# Patient Record
Sex: FEMALE | Race: WHITE | Employment: OTHER | URBAN - METROPOLITAN AREA
[De-identification: names, ages, dates, MRNs, and addresses within clinical notes are randomized per-mention and may not be internally consistent; named-entity substitution may affect disease eponyms.]

---

## 2017-04-11 ENCOUNTER — OFFICE VISIT (OUTPATIENT)
Dept: PAIN MANAGEMENT | Age: 77
End: 2017-04-11

## 2017-04-11 VITALS
WEIGHT: 173 LBS | SYSTOLIC BLOOD PRESSURE: 127 MMHG | RESPIRATION RATE: 17 BRPM | HEIGHT: 68 IN | DIASTOLIC BLOOD PRESSURE: 74 MMHG | HEART RATE: 91 BPM | BODY MASS INDEX: 26.22 KG/M2

## 2017-04-11 DIAGNOSIS — G89.29 NECK PAIN, CHRONIC: ICD-10-CM

## 2017-04-11 DIAGNOSIS — M25.561 BILATERAL CHRONIC KNEE PAIN: ICD-10-CM

## 2017-04-11 DIAGNOSIS — M25.562 BILATERAL CHRONIC KNEE PAIN: ICD-10-CM

## 2017-04-11 DIAGNOSIS — M15.9 GENERALIZED OSTEOARTHRITIS: ICD-10-CM

## 2017-04-11 DIAGNOSIS — G89.29 BILATERAL CHRONIC KNEE PAIN: ICD-10-CM

## 2017-04-11 DIAGNOSIS — Z86.59 HISTORY OF ANXIETY: ICD-10-CM

## 2017-04-11 DIAGNOSIS — M47.812 OSTEOARTHRITIS OF CERVICAL SPINE, UNSPECIFIED SPINAL OSTEOARTHRITIS COMPLICATION STATUS: ICD-10-CM

## 2017-04-11 DIAGNOSIS — Z86.59 HISTORY OF DEPRESSION: ICD-10-CM

## 2017-04-11 DIAGNOSIS — M25.50 POLYARTHRALGIA: Primary | ICD-10-CM

## 2017-04-11 DIAGNOSIS — G89.4 CHRONIC PAIN SYNDROME: ICD-10-CM

## 2017-04-11 DIAGNOSIS — Z79.899 ENCOUNTER FOR LONG-TERM (CURRENT) USE OF MEDICATIONS: ICD-10-CM

## 2017-04-11 DIAGNOSIS — M54.2 NECK PAIN, CHRONIC: ICD-10-CM

## 2017-04-11 DIAGNOSIS — M50.30 DEGENERATIVE DISC DISEASE, CERVICAL: ICD-10-CM

## 2017-04-11 LAB
ALCOHOL UR POC: NORMAL
AMPHETAMINES UR POC: NEGATIVE
BARBITURATES UR POC: NEGATIVE
BENZODIAZEPINES UR POC: NORMAL
BUPRENORPHINE UR POC: NORMAL
CANNABINOIDS UR POC: NEGATIVE
CARISOPRODOL UR POC: NORMAL
COCAINE UR POC: NEGATIVE
FENTANYL UR POC: NORMAL
MDMA/ECSTASY UR POC: NEGATIVE
METHADONE UR POC: NEGATIVE
METHAMPHETAMINE UR POC: NEGATIVE
METHYLPHENIDATE UR POC: NEGATIVE
OPIATES UR POC: NEGATIVE
OXYCODONE UR POC: NEGATIVE
PHENCYCLIDINE UR POC: NEGATIVE
PROPOXYPHENE UR POC: NORMAL
TRAMADOL UR POC: NORMAL
TRICYCLICS UR POC: NEGATIVE

## 2017-04-11 RX ORDER — MORPHINE SULFATE 15 MG/1
15 TABLET, FILM COATED, EXTENDED RELEASE ORAL 3 TIMES DAILY
Qty: 90 TAB | Refills: 0 | Status: SHIPPED | OUTPATIENT
Start: 2017-04-11 | End: 2017-05-02 | Stop reason: SDUPTHER

## 2017-04-11 RX ORDER — NALOXONE HYDROCHLORIDE 4 MG/.1ML
4 SPRAY NASAL AS NEEDED
Qty: 1 BOTTLE | Refills: 1 | Status: SHIPPED | OUTPATIENT
Start: 2017-04-11 | End: 2021-10-06

## 2017-04-11 RX ORDER — AMLODIPINE BESYLATE 5 MG/1
5 TABLET ORAL DAILY
COMMUNITY
End: 2021-10-06

## 2017-04-11 NOTE — MR AVS SNAPSHOT
Visit Information Date & Time Provider Department Dept. Phone Encounter #  
 4/11/2017  1:15 PM Mario Webber MD [address] [place] [address] for Pain Management [id number] Follow-up Instructions Return in about 1 month (around 5/11/2017). Your Appointments 5/2/2017  2:00 PM  
Follow Up with Mario Webber MD  
[address] [place] [address] for Pain Management [facility]) Appt Note: return in  1 month [address] [facility] [address]  
[phone]  
  
    
 5/5/2017  1:00 PM  
Office Visit with [facility] [address] [place] [address] for Pain Management (MACHELLE SCHEDULING) [address] [facility] [address]  
[phone] [facility] [id number] Upcoming Health Maintenance Date Due  
 FOOT EXAM Q1 3/3/1950 MICROALBUMIN Q1 3/3/1950 EYE EXAM RETINAL OR DILATED Q1 3/3/1950 DTaP/Tdap/Td series (1 - Tdap) 3/3/1959 ZOSTER VACCINE AGE 60> 3/3/1998 GLAUCOMA SCREENING Q2Y 3/3/2003 MEDICARE YEARLY EXAM 3/3/2003 Pneumococcal 65+ Low/Medium Risk (2 of 2 - PPSV23) 1/1/2016 INFLUENZA AGE 9 TO ADULT 8/1/2016 LIPID PANEL Q1 9/8/2016 HEMOGLOBIN A1C Q6M 5/25/2017 Allergies as of 4/11/2017  Review Complete On: 4/11/2017 By: Mario Webber MD  
  
 Severity Noted Reaction Type Reactions Adhesive  03/24/2016    Itching Augmentin [Amoxicillin-pot Clavulanate]  12/22/2012   Side Effect Nausea and Vomiting, Nausea Only Hydrocodone-acetaminophen  03/24/2016    Unknown (comments) Iodinated Contrast Media - Oral And Iv Dye  03/16/2016    Shortness of Breath \"MY BODY WENT AGAINST ME\" Nubain [Nalbuphine]    Unknown (comments), Shortness of Breath Labored breathing and chest tightness Current Immunizations  Reviewed on 1/19/2015 Name Date Pneumococcal Vaccine (Unspecified Type) 1/1/2011 Not reviewed this visit You Were Diagnosed With   
  
 Codes Comments Polyarthralgia    -  Primary ICD-10-CM: M25.50 ICD-9-CM: 719.49 Encounter for long-term (current) use of medications     ICD-10-CM: Z79.899 ICD-9-CM: V58.69 Chronic pain syndrome     ICD-10-CM: G89.4 ICD-9-CM: 338.4 Bilateral chronic knee pain     ICD-10-CM: M25.561, M25.562, G89.29 ICD-9-CM: 719.46, 338.29 Neck pain, chronic     ICD-10-CM: M54.2, G89.29 ICD-9-CM: 723.1, 338.29 Generalized osteoarthritis     ICD-10-CM: M15.9 ICD-9-CM: 715.00 Degenerative disc disease, cervical     ICD-10-CM: M50.30 ICD-9-CM: 722.4 Osteoarthritis of cervical spine, unspecified spinal osteoarthritis complication status     PNV-62-SA: O24.750 ICD-9-CM: 721.0 History of anxiety     ICD-10-CM: Z86.59 
ICD-9-CM: V11.8 History of depression     ICD-10-CM: Z86.59 
ICD-9-CM: V11.8 Vitals BP Pulse Resp Height(growth percentile) Weight(growth percentile) BMI  
 127/74 91 17 5' 8\" (1.727 m) 173 lb (78.5 kg) 26.3 kg/m2 OB Status Smoking Status Postmenopausal Never Smoker Vitals History BMI and BSA Data Body Mass Index Body Surface Area  
 26.3 kg/m 2 1.94 m 2 Preferred Pharmacy Pharmacy Name Phone [facility], [address] [place] [address] [phone] Your Updated Medication List  
  
   
This list is accurate as of: 4/11/17  1:56 PM.  Always use your most recent med list.  
  
  
  
  
 amiodarone 200 mg tablet Commonly known as:  CORDARONE Take 1 tablet by mouth daily. amLODIPine 5 mg tablet Commonly known as:  Kathi Half Take 5 mg by mouth daily. apixaban 5 mg tablet Commonly known as:  Claudean Welling Take 1 tablet by mouth two (2) times a day. Indications: AF  
  
 atorvastatin 20 mg tablet Commonly known as:  LIPITOR Take 20 mg by mouth daily. COREG 25 mg tablet Generic drug:  carvedilol Take 25 mg by mouth two (2) times daily (with meals). escitalopram oxalate 10 mg tablet Commonly known as:  Linda David Take 1 Tab by mouth daily. furosemide 20 mg tablet Commonly known as:  LASIX Take 3 tablet daily with breakfast  
  
 levothyroxine 25 mcg tablet Commonly known as:  SYNTHROID Take  by mouth Daily (before breakfast). lisinopril 20 mg tablet Commonly known as:  Nancy Feil Take 20 mg by mouth daily. morphine IR 30 mg tablet Commonly known as:  MS IR Take 1 Tab by mouth four (4) times daily as needed for Pain. Max Daily Amount: 120 mg.  
  
 oxyCODONE-acetaminophen 5-325 mg per tablet Commonly known as:  PERCOCET Take 1 Tab by mouth every six (6) hours as needed for Pain. Max Daily Amount: 4 Tabs. potassium chloride 20 mEq tablet Commonly known as:  K-DUR, KLOR-CON Take 0.5 Tabs by mouth daily. spironolactone 25 mg tablet Commonly known as:  ALDACTONE Take 1 Tab by mouth daily. We Performed the Following AMB POC DRUG SCREEN () [ [facility]] DRUG SCREEN [[id number] Custom] Follow-up Instructions Return in about 1 month (around 5/11/2017). Introducing [facility]! Toribio Nyhan introduces [employer] patient portal. Now you can access parts of your medical record, email your doctor's office, and request medication refills online. 1. In your internet browser, go to [email or web address] 2. Click on the First Time User? Click Here link in the Sign In box. You will see the New Member Sign Up page. 3. Enter your [employer] Access Code exactly as it appears below. You will not need to use this code after youve completed the sign-up process. If you do not sign up before the expiration date, you must request a new code. · [employer] Access Code: [email or web address] Expires: 7/10/2017  1:56 PM 
 
 4. Enter the last four digits of your Social Security Number (xxxx) and Date of Birth (mm/dd/yyyy) as indicated and click Submit. You will be taken to the next sign-up page. 5. Create a [email or web address] ID. This will be your [email or web address] login ID and cannot be changed, so think of one that is secure and easy to remember. 6. Create a [email or web address] password. You can change your password at any time. 7. Enter your Password Reset Question and Answer. This can be used at a later time if you forget your password. 8. Enter your e-mail address. You will receive e-mail notification when new information is available in [address] 9. Click Sign Up. You can now view and download portions of your medical record. 10. Click the Download Summary menu link to download a portable copy of your medical information. If you have questions, please visit the Frequently Asked Questions section of the [email or web address] website. Remember, [employer] is NOT to be used for urgent needs. For medical emergencies, dial 911. Now available from your iPhone and Android! Please provide this summary of care documentation to your next provider. Your primary care clinician is listed as NONE. If you have any questions after today's visit, please call [phone].

## 2017-04-11 NOTE — PROGRESS NOTES
HISTORY OF PRESENT ILLNESS  Edouard Gupta is a 76 y.o. female. HPI Comments: New patient referred by internal medicine clinic, Dr. Garay All for chronic pain management. Visit survey reviewed  On the pain diagram the patient indicates pain to many different joints including both shoulders, both knees, joints of both hands and also some neck pain. The least amount of pain, 7 out of 10. Greatest pain 8 out of 10. Average pain 7 out of 10. I have reviewed the listed medications on the visit survey which includes Lexapro. I have reviewed medications and the available medical records and also prescription monitoring program.  The information indicates that while she was involved with previous pain clinic, [address] [facility], they were prescribing a combination of immediate release morphine, 30 mg 4 times a day as needed in addition to extended release morphine 60 mg twice a day. There have been prescriptions for hydrocodone in the past.  The most recent prescriptions, I believe from medical clinics have been-5 mg Percocet. Actually the most recent prescription was for tramadol. Medical information indicates there have been prescriptions for EMLA cream, prescriptions for Neurontin. At least one physician has recommended, that she not use nonsteroidal anti-inflammatory drugs for medical reasons. I believe, history of significant coronary artery disease was the primary medical reason. I believe at times she has also been on blood thinners. Progress notes from different clinics indicate a history of generalized osteoarthritis. Chronic pain to different joints. Medical information indicates history of anxiety, history depression, history of fracture-distal end of radius. Today the patient did report a history of depression. Today the patient did admit to using her daughter's Valium recently. She has been involved with several different pain clinics over the years.   Most recently, she was discharged from [facility] pain clinic. I reviewed records, this included a termination letter. August 24, 2016. Reason for termination: \"Inconsistent drug screen\". -I was also able to review progress note from yet another, different pain clinic. This was comprehensive pain management center, [facility]. Dr. Rob Kumar. Progress note, 2/10/17. Primary diagnosis-neck pain, degenerative disc disease cervical spine. They recommended gabapentin, physical therapy. They did not recommend use of opioid pain medication, evaluating risk versus benefit. They did report on the radiology impression, CT of cervical spine, November 2013. Extensive degenerative disc disease and degenerative joint disease which had worsened from prior exam.  Please see the report for specific details. It should be noted that this particular physician in this particular pain clinic certainly has the reputation for rarely prescribing opioid pain medication. They did recommend cervical spine injections for the patient.  -Radiologist impression, x-ray of the knees, 2/27/17: Bilateral osteoarthritis  X-ray of the lumbar spine, 2/27/17: Moderate lumbar spondylosis. X-ray of cervical spine 2/27/17: Mild to moderate degenerative changes. X-ray of the left shoulder 11/13/16: Displaced angulated proximal metaphyseal fracture of the humerus with interval callus formation from 9/26/16. Osteopenia and degenerative changes as noted. Please see radiology reports for details. -- Chief complaint, neck pain and pain to multiple joints. Pain to different areas for at least 23 years. Pain is primarily achy. It is present during the day and the night, almost constant. Pain increases to different areas with physical activity. Neck pain does not classically radiate into either arm. No history of cervical or lumbar spine surgery. No history of joint surgery.   It is my understanding that knee replacements have been suggested in the past but the patient is not interested. The patient's daughter is present during today's visit and is helpful in providing history. The patient chose not to return to the most recent pain clinic, Dr. Edwin Lewis. Is my understanding she was not discharged from that particular clinic. She has tried different opioid and non-opioid pain medication over the years. She did not feel the gabapentin was helpful. No recent use of opioid pain medication. Significant nausea and vomiting with Vicodin. No significant side effects with Percocet or morphine. No benefit tramadol. She reports that when she was using the combination of short acting and long-acting morphine with previous pain clinic this did help her pain. Her daughter reports, that it may have been too much pain medication. She has worked with physical therapy in the past.  She has tried joint injections before. She has been seen by orthopedic clinics and also rheumatology in the past.      -Prescription monitoring program reviewed. --18 different prescriptions, 8 different prescribers, 4 different pharmacies in the past year. The patient  should keep track of total Tylenol intake and make sure liver function tests have been checked with primary care physician.    -opioid risk tool has been reviewed, and will be scanned. Total score--1    -The available medical record/information has been reviewed including notes from the referring physician's office.     -New patient survey reviewed and will be scanned. Please see this form for more information concerning PMH,FH,SH, and ROS. The majority of today's visit was spent counseling and coordinating care. Total visit time was greater than 60 minutes. - Preliminary urine screen reviewed.        - Additional time was spent reviewing medical records,interpreting data and educating the patient.                              -Discussing Dxes,condition and treatment options,possible side effects/risks/complications. Review of Systems   Constitutional: Negative for malaise/fatigue and weight loss. HENT: Positive for tinnitus. Eyes: Negative for blurred vision and double vision. Respiratory: Negative for cough and sputum production. Cardiovascular: Negative for chest pain and palpitations. Gastrointestinal: Negative for heartburn and nausea. Genitourinary: Negative for dysuria and urgency. Musculoskeletal: Positive for joint pain and neck pain. Skin: Negative for itching and rash. Neurological: Negative for focal weakness, seizures and headaches. Endo/Heme/Allergies: Negative for environmental allergies. Does not bruise/bleed easily. Psychiatric/Behavioral: Positive for depression. Negative for suicidal ideas. The patient has insomnia. Physical Exam   Constitutional: She appears well-developed and well-nourished. She is cooperative. She does not have a sickly appearance. HENT:   Head: Normocephalic and atraumatic. Right Ear: External ear normal. No drainage. Left Ear: External ear normal. No drainage. Nose: Nose normal.   Mouth/Throat: No oropharyngeal exudate. Eyes: Lids are normal. Right eye exhibits no discharge. Left eye exhibits no discharge. Right conjunctiva has no hemorrhage. Left conjunctiva has no hemorrhage. Pupils are equal.   Neck: Neck supple. No tracheal deviation present. No thyroid mass present. Cardiovascular: Normal rate and regular rhythm. No murmur heard. Pulmonary/Chest: Effort normal and breath sounds normal. No respiratory distress. Neurological: She is alert. She has normal reflexes. No cranial nerve deficit.    Mildly antalgic gait without assistive device  Decreased deep tendon reflexes lower extremities and upper extremities  Grossly normal strength upper extremities, grossly normal light touch sensation upper extremities  No spasm to cervical muscles  Limited range of motion and generalized tenderness of the neck  Significant decreased range of motion both shoulders, mild decreased range of motion both knees. Bony enlargement and mild increased warmth to both knees. Skin: Skin is intact. No abrasion and no rash noted. She is not diaphoretic. No cyanosis. Psychiatric: Her speech is normal. Her mood appears anxious. Her affect is not angry. She is not agitated. Thought content is not paranoid. Cognition and memory are not impaired. She does not express impulsivity or inappropriate judgment. She does not exhibit a depressed mood. ASSESSMENT and PLAN  Encounter Diagnoses   Name Primary?  Polyarthralgia Yes    Encounter for long-term (current) use of medications     Chronic pain syndrome     Bilateral chronic knee pain     Neck pain, chronic     Generalized osteoarthritis     Degenerative disc disease, cervical     Osteoarthritis of cervical spine, unspecified spinal osteoarthritis complication status     History of anxiety     History of depression    Pain is a complex sensory and emotional experience intimately related to the concept of suffering and the life experiences and psychological makeup of the individual. There is a host of psychosocial issues,including depression,anxiety,fear,altered social roles,and loss of activities which have a strong scientific basis for contributing to the chronic pain state. The effective treatment of chronic pain involves understanding the individual with pain. Issues of fear avoidance,catastrophizing,belief systems about exercise and injury,sleep disturbance,family dynamics,and other factors may play a role in the patient's experience of pain. Because the patient's current regimen places him/her at increased risk for possible overdose, a prescription for the lock so nasal spray is being provided.   The patient understands that this medication is only to be used in the setting of a possible overdose and that inadvertent use of this medication could precipitate overt withdrawal.  I discussed naloxone with the patient today. In addition, the patient has received the naloxone instruction sheet.    -The patient's significant pain is almost constant. Non-opioid medications did not lead to significant benefit. I do recommend a low-dose, long-acting opioid. She has benefited from morphine before. Extended release morphine 15 mg every 8 hours  Consider a compound cream to use to the neck and joints but first I would need approval from cardiology that use of an nonsteroidal anti-inflammatory drug in a cream form is felt to be safe. She would benefit from regular exercise, gentle, in the water. I have explained to the patient that with her past history of discharge from a previous pain clinic, recent use of her daughter's Valium, she really needs to be quite careful and follow the opioid agreement with our pain clinic. 1. Medications are prescribed with the objective of pain relief and improved physical and psychosocial function in this patient. (improve quality of life)  2. The patient has been counseled  on proper use of prescribed medications. 3. The patient has been counseled  about chronic medical conditions and their relationship to anxiety and depression. 4. Reviewed with patient self-help tools, home exercise, and lifestyle changes to assist the patient in self-management of symptoms. 5. Advised patient to have a primary care provider to continue care for health maintenance and general medical conditions and support for referral to specialty care as needed. 6. Reviewed with patient the treatment plan, goals of treatment plan, and limitations of treatment plan, to include the potential for side effects from medications. If side effects occur, it is the responsibility of the patient to inform the clinic so that a change in the treatment plan can be made in a safe manner.  The patient is advised that stopping prescribed medication may cause an increase in symptoms and possible medication withdrawal symptoms. The patient is informed an emergency room evaluation may be necessary if this occurs. DISPOSITION: The patients condition and plan were discussed at length and all questions were answered. -Dragon medical dictation software was used for portions of this report. Unintended errors may occur. Follow-up 1 month  She may continue her Lexapro from her other physician, to help with anxiety and depression.

## 2017-05-02 ENCOUNTER — OFFICE VISIT (OUTPATIENT)
Dept: PAIN MANAGEMENT | Age: 77
End: 2017-05-02

## 2017-05-02 VITALS — SYSTOLIC BLOOD PRESSURE: 117 MMHG | HEART RATE: 76 BPM | DIASTOLIC BLOOD PRESSURE: 67 MMHG | RESPIRATION RATE: 19 BRPM

## 2017-05-02 DIAGNOSIS — M15.9 GENERALIZED OSTEOARTHRITIS: ICD-10-CM

## 2017-05-02 DIAGNOSIS — G89.29 BILATERAL CHRONIC KNEE PAIN: ICD-10-CM

## 2017-05-02 DIAGNOSIS — G89.29 NECK PAIN, CHRONIC: ICD-10-CM

## 2017-05-02 DIAGNOSIS — M50.30 DEGENERATIVE DISC DISEASE, CERVICAL: ICD-10-CM

## 2017-05-02 DIAGNOSIS — M25.561 BILATERAL CHRONIC KNEE PAIN: ICD-10-CM

## 2017-05-02 DIAGNOSIS — G89.4 CHRONIC PAIN SYNDROME: ICD-10-CM

## 2017-05-02 DIAGNOSIS — M54.2 NECK PAIN, CHRONIC: ICD-10-CM

## 2017-05-02 DIAGNOSIS — M25.50 POLYARTHRALGIA: Primary | ICD-10-CM

## 2017-05-02 DIAGNOSIS — Z86.59 HISTORY OF ANXIETY: ICD-10-CM

## 2017-05-02 DIAGNOSIS — M25.562 BILATERAL CHRONIC KNEE PAIN: ICD-10-CM

## 2017-05-02 DIAGNOSIS — Z86.59 HISTORY OF DEPRESSION: ICD-10-CM

## 2017-05-02 RX ORDER — MORPHINE SULFATE 15 MG/1
15 TABLET, FILM COATED, EXTENDED RELEASE ORAL 3 TIMES DAILY
Qty: 90 TAB | Refills: 0 | Status: SHIPPED | OUTPATIENT
Start: 2017-05-02 | End: 2017-05-02

## 2017-05-02 RX ORDER — MORPHINE SULFATE 15 MG/1
15 TABLET, FILM COATED, EXTENDED RELEASE ORAL 3 TIMES DAILY
Qty: 90 TAB | Refills: 0 | Status: SHIPPED | OUTPATIENT
Start: 2017-06-01 | End: 2017-06-28

## 2017-05-02 NOTE — PROGRESS NOTES
HISTORY OF PRESENT ILLNESS  Cely Linn is a 76 y.o. female. HPI Comments: Meds help with pain control and quality of life. No new side effects reported today. Visit survey reviewed and will be scanned.  reviewed. Recent average level of pain(out of 10)-5  Chief complaint, pain to multiple joints  Chronic pain  Using extended release morphine 15 mg 3 times a day  Medication helps improve general activity, mood, sleep      Measuring clinical outcomes of chronic pain patients. This was reviewed today. The survey will be scanned. Please see the survey for details. Total score-8      Review of Systems   Constitutional: Negative for malaise/fatigue and weight loss. HENT: Positive for tinnitus. Eyes: Negative for blurred vision and double vision. Respiratory: Negative for cough and sputum production. Cardiovascular: Negative for chest pain and palpitations. Gastrointestinal: Negative for heartburn and nausea. Genitourinary: Negative for dysuria and urgency. Musculoskeletal: Positive for joint pain and neck pain. Skin: Negative for itching and rash. Neurological: Negative for focal weakness, seizures and headaches. Endo/Heme/Allergies: Negative for environmental allergies. Does not bruise/bleed easily. Psychiatric/Behavioral: Positive for depression. Negative for suicidal ideas. The patient has insomnia. Physical Exam   Constitutional: She appears well-developed and well-nourished. She is cooperative. She does not have a sickly appearance. HENT:   Head: Normocephalic and atraumatic. Right Ear: External ear normal. No drainage. Left Ear: External ear normal. No drainage. Nose: Nose normal.   Eyes: Lids are normal. Right eye exhibits no discharge. Left eye exhibits no discharge. Right conjunctiva has no hemorrhage. Left conjunctiva has no hemorrhage. Neck: Neck supple. No tracheal deviation present. No thyroid mass present.    Pulmonary/Chest: Effort normal. No respiratory distress. Neurological: She is alert. No cranial nerve deficit. Skin: Skin is intact. No rash noted. Psychiatric: Her speech is normal. Her affect is not angry. She does not express inappropriate judgment. Nursing note and vitals reviewed. ASSESSMENT and PLAN  Encounter Diagnoses   Name Primary?  Polyarthralgia Yes    Chronic pain syndrome     Bilateral chronic knee pain     Neck pain, chronic     Generalized osteoarthritis     Degenerative disc disease, cervical     History of anxiety     History of depression    No indicators for recent medication misuse.  reviewed. Pain Meds and Quality Of Life have been reviewed. Nonpharmacologic therapy and non-opioid pharmacologic therapy were considered. If opioid therapy is prescribed, this is only if the expected benefits are anticipated to outweigh risks. Possible changes to treatment plan considered. Support/education given as needed. Today-medications are as listed. No significant changes to medications. Follow up -- 2 months.

## 2017-05-02 NOTE — PROGRESS NOTES
Nursing Notes    Patient presents to the office today in follow-up. Reviewed medications with counts as follows:    Rx Date filled Qty Dispensed Pill Count Last Dose Short   Morphine er 15 mg 4/11/17 90 26 today no   Ms. Catherine Prajapati has a reminder for a \"due or due soon\" health maintenance. I have asked that she contact her primary care provider for follow-up on this health maintenance. POC UDS was not performed in office today    Any new labs or imaging since last appointment? NO    Have you been to an emergency room (ER) or urgent care clinic since your last visit? NO            Have you been hospitalized since your last visit? NO     If yes, where, when, and reason for visit? Have you seen or consulted any other health care providers outside of the [address] [place] [address]  since your last visit? NO     If yes, where, when, and reason for visit?

## 2017-05-02 NOTE — MR AVS SNAPSHOT
Visit Information Date & Time Provider Department Dept. Phone Encounter #  
 5/2/2017  2:00 PM Rosa Cr MD [facility] [phone] [id number] Follow-up Instructions Return in about 2 months (around 7/2/2017). Follow-up and Disposition History Your Appointments 5/2/2017  2:00 PM  
Follow Up with Rosa Cr MD  
[facility] [address]) Appt Note: return in  1 month [address] [facility] [address]  
[phone] [id number]  
  
    
 5/5/2017  1:00 PM  
Office Visit with [facility] EDUC CLASS [facility] (MACHELLE SCHEDULING) [address] [facility] [address]  
[phone]  [place] [id number] Upcoming Health Maintenance Date Due  
 FOOT EXAM Q1 3/3/1950 MICROALBUMIN Q1 3/3/1950 EYE EXAM RETINAL OR DILATED Q1 3/3/1948 DTaP/Tdap/Td series (1 - Tdap) 3/3/1959 ZOSTER VACCINE AGE 60> 3/3/1998 GLAUCOMA SCREENING Q2Y 3/3/2003 MEDICARE YEARLY EXAM 3/3/2003 Pneumococcal 65+ Low/Medium Risk (2 of 2 - PPSV23) 1/1/2016 LIPID PANEL Q1 9/8/2016 HEMOGLOBIN A1C Q6M 5/25/2017 INFLUENZA AGE 9 TO ADULT 8/1/2017 Allergies as of 5/2/2017  Review Complete On: 5/2/2017 By: Rosa Cr MD  
  
 Severity Noted Reaction Type Reactions Adhesive  03/24/2016    Itching Augmentin [Amoxicillin-pot Clavulanate]  12/22/2012   Side Effect Nausea and Vomiting, Nausea Only Hydrocodone-acetaminophen  03/24/2016    Unknown (comments) Iodinated Contrast Media - Oral And Iv Dye  03/16/2016    Shortness of Breath \"MY BODY WENT AGAINST ME\" Nubain [Nalbuphine]    Unknown (comments), Shortness of Breath Labored breathing and chest tightness Current Immunizations  Reviewed on 1/19/2015 Name Date Pneumococcal Vaccine (Unspecified Type) 1/1/2011 Not reviewed this visit You Were Diagnosed With   
  
 Codes Comments Polyarthralgia    -  Primary ICD-10-CM: M25.50 ICD-9-CM: 719.49 Chronic pain syndrome     ICD-10-CM: G89.4 ICD-9-CM: 338.4 Bilateral chronic knee pain     ICD-10-CM: M25.561, M25.562, G89.29 ICD-9-CM: 719.46, 338.29 Neck pain, chronic     ICD-10-CM: M54.2, G89.29 ICD-9-CM: 723.1, 338.29 Generalized osteoarthritis     ICD-10-CM: M15.9 ICD-9-CM: 715.00 Degenerative disc disease, cervical     ICD-10-CM: M50.30 ICD-9-CM: 722.4 History of anxiety     ICD-10-CM: Z86.59 
ICD-9-CM: V11.8 History of depression     ICD-10-CM: Z86.59 
ICD-9-CM: V11.8 Vitals BP Pulse Resp OB Status Smoking Status 117/67 76 19 Postmenopausal Never Smoker Vitals History Preferred Pharmacy Pharmacy Name Phone [facility] [address] [phone] Your Updated Medication List  
  
   
This list is accurate as of: 5/2/17  1:57 PM.  Always use your most recent med list.  
  
  
  
  
 amiodarone 200 mg tablet Commonly known as:  CORDARONE Take 1 tablet by mouth daily. amLODIPine 5 mg tablet Commonly known as:  Yoli Fast Take 5 mg by mouth daily. apixaban 5 mg tablet Commonly known as:  Florecita Umair Take 1 tablet by mouth two (2) times a day. Indications: AF  
  
 atorvastatin 20 mg tablet Commonly known as:  LIPITOR Take 20 mg by mouth daily. COREG 25 mg tablet Generic drug:  carvedilol Take 25 mg by mouth two (2) times daily (with meals). escitalopram oxalate 10 mg tablet Commonly known as:  Ranell Rakesh Take 1 Tab by mouth daily. furosemide 20 mg tablet Commonly known as:  LASIX Take 3 tablet daily with breakfast  
  
 levothyroxine 25 mcg tablet Commonly known as:  SYNTHROID Take  by mouth Daily (before breakfast). lisinopril 20 mg tablet Commonly known as:  Michael [place] Take 20 mg by mouth daily. * morphine IR 30 mg tablet Commonly known as:  MS IR Take 1 Tab by mouth four (4) times daily as needed for Pain. Max Daily Amount: 120 mg.  
  
 * morphine CR 15 mg CR tablet Commonly known as:  MS CONTIN Take 1 Tab by mouth three (3) times daily. Max Daily Amount: 45 mg. Start taking on:  6/1/2017  
  
 naloxone 4 mg/actuation Spry 4 mg by Nasal route as needed. oxyCODONE-acetaminophen 5-325 mg per tablet Commonly known as:  PERCOCET Take 1 Tab by mouth every six (6) hours as needed for Pain. Max Daily Amount: 4 Tabs. potassium chloride 20 mEq tablet Commonly known as:  K-DUR, KLOR-CON Take 0.5 Tabs by mouth daily. spironolactone 25 mg tablet Commonly known as:  ALDACTONE Take 1 Tab by mouth daily. * Notice: This list has 2 medication(s) that are the same as other medications prescribed for you. Read the directions carefully, and ask your doctor or other care provider to review them with you. Prescriptions Printed Refills  
 morphine CR (MS CONTIN) 15 mg CR tablet 0 Starting on: 6/1/2017 Sig: Take 1 Tab by mouth three (3) times daily. Max Daily Amount: 45 mg.  
 Class: Print Route: Oral  
  
Follow-up Instructions Return in about 2 months (around 7/2/2017). Introducing \[facility]\"" & HEALTH SERVICES! [employer] introduces [employer] patient portal. Now you can access parts of your medical record, email your doctor's office, and request medication refills online. 1. In your internet browser, go to [email or web address] 2. Click on the First Time User? Click Here link in the Sign In box. You will see the New Member Sign Up page. 3. Enter your [employer] Access Code exactly as it appears below. You will not need to use this code after youve completed the sign-up process.  If you do not sign up before the expiration date, you must request a new code. · [employer] Access Code: [email or web address] Expires: 7/10/2017  1:56 PM 
 
4. Enter the last four digits of your Social Security Number (xxxx) and Date of Birth (mm/dd/yyyy) as indicated and click Submit. You will be taken to the next sign-up page. 5. Create a [employer] ID. This will be your [employer] login ID and cannot be changed, so think of one that is secure and easy to remember. 6. Create a [employer] password. You can change your password at any time. 7. Enter your Password Reset Question and Answer. This can be used at a later time if you forget your password. 8. Enter your e-mail address. You will receive e-mail notification when new information is available in [address] 9. Click Sign Up. You can now view and download portions of your medical record. 10. Click the Download Summary menu link to download a portable copy of your medical information. If you have questions, please visit the Frequently Asked Questions section of the [employer] website. Remember, [employer] is NOT to be used for urgent needs. For medical emergencies, dial 911. Now available from your iPhone and Android! Please provide this summary of care documentation to your next provider. Your primary care clinician is listed as NONE. If you have any questions after today's visit, please call [phone].

## 2017-05-05 ENCOUNTER — OFFICE VISIT (OUTPATIENT)
Dept: PAIN MANAGEMENT | Age: 77
End: 2017-05-05

## 2017-05-05 DIAGNOSIS — Z71.89 COUNSELING AND COORDINATION OF CARE: Primary | ICD-10-CM

## 2017-05-05 NOTE — PROGRESS NOTES
Ms. Aubrey Hussein attended the Education Class facilitated by Abraham Dia LPN. Objectives of this class are to review practice policies, protocols and the Controlled Substances Consent and Treatment Agreement, discuss \"what if\" scenarios, introduce staff, and provide an opportunity for questions and answers. Ultimately, goals for this class are for Ms. Mendoza to:    · Be educated - Learn as much as possible about her pain and related treatment, our policies and the Controlled Substances Agreement. · Be responsible - Follow the providers advice regarding treatment recommendations, medications, and prescription information. · Be confident - Better manage her pain and return to a more functional lifestyle. At least 45 minutes was spent with the patient in face-to-face contact today.

## 2017-06-28 ENCOUNTER — OFFICE VISIT (OUTPATIENT)
Dept: PAIN MANAGEMENT | Age: 77
End: 2017-06-28

## 2017-06-28 VITALS
HEART RATE: 82 BPM | HEIGHT: 68 IN | RESPIRATION RATE: 16 BRPM | DIASTOLIC BLOOD PRESSURE: 82 MMHG | SYSTOLIC BLOOD PRESSURE: 138 MMHG | BODY MASS INDEX: 27.74 KG/M2 | WEIGHT: 183 LBS

## 2017-06-28 DIAGNOSIS — M54.2 NECK PAIN, CHRONIC: ICD-10-CM

## 2017-06-28 DIAGNOSIS — M25.561 BILATERAL CHRONIC KNEE PAIN: ICD-10-CM

## 2017-06-28 DIAGNOSIS — M50.30 DEGENERATIVE DISC DISEASE, CERVICAL: ICD-10-CM

## 2017-06-28 DIAGNOSIS — G89.29 BILATERAL CHRONIC KNEE PAIN: ICD-10-CM

## 2017-06-28 DIAGNOSIS — Z86.59 HISTORY OF ANXIETY: ICD-10-CM

## 2017-06-28 DIAGNOSIS — M25.562 BILATERAL CHRONIC KNEE PAIN: ICD-10-CM

## 2017-06-28 DIAGNOSIS — G89.29 NECK PAIN, CHRONIC: ICD-10-CM

## 2017-06-28 DIAGNOSIS — M15.9 GENERALIZED OSTEOARTHRITIS: ICD-10-CM

## 2017-06-28 DIAGNOSIS — M54.2 CERVICALGIA: ICD-10-CM

## 2017-06-28 DIAGNOSIS — M25.512 PAIN IN JOINT OF LEFT SHOULDER: ICD-10-CM

## 2017-06-28 DIAGNOSIS — M25.50 PAIN IN JOINT, MULTIPLE SITES: Primary | ICD-10-CM

## 2017-06-28 DIAGNOSIS — Z86.59 HISTORY OF DEPRESSION: ICD-10-CM

## 2017-06-28 DIAGNOSIS — G89.4 CHRONIC PAIN SYNDROME: ICD-10-CM

## 2017-06-28 DIAGNOSIS — L40.50 PSORIATIC ARTHROPATHY (HCC): ICD-10-CM

## 2017-06-28 RX ORDER — MORPHINE SULFATE 15 MG/1
15 TABLET, FILM COATED, EXTENDED RELEASE ORAL 3 TIMES DAILY
Qty: 90 TAB | Refills: 0 | Status: SHIPPED | OUTPATIENT
Start: 2017-07-27 | End: 2017-08-24

## 2017-06-28 RX ORDER — MORPHINE SULFATE 15 MG/1
15 TABLET, FILM COATED, EXTENDED RELEASE ORAL 3 TIMES DAILY
Qty: 90 TAB | Refills: 0 | Status: SHIPPED | OUTPATIENT
Start: 2017-06-28 | End: 2017-08-24

## 2017-06-28 NOTE — PROGRESS NOTES
Nursing Notes    Patient presents to the office today in follow-up. Reviewed medications with counts as follows:    Rx Date filled Qty Dispensed Pill Count Last Dose Short   Morphine er 15 mg 6/9/17 90 27 today no   Ms. Lea Figueroa has a reminder for a \"due or due soon\" health maintenance. I have asked that she contact her primary care provider for follow-up on this health maintenance. POC UDS was not performed in office today    Any new labs or imaging since last appointment? NO    Have you been to an emergency room (ER) or urgent care clinic since your last visit? NO            Have you been hospitalized since your last visit? NO     If yes, where, when, and reason for visit? Have you seen or consulted any other health care providers outside of the [address] [place] [address]  since your last visit? YES     If yes, where, when, and reason for visit?    Dr Yoon-cardiologist

## 2017-06-28 NOTE — PROGRESS NOTES
HISTORY OF PRESENT ILLNESS  Flower Stack is a 76 y.o. female. HPI Comments: Meds help with pain control and quality of life. No new side effects reported today. Visit survey reviewed and will be scanned.  reviewed. Recent average level of pain(out of 10)-6  Chief complaint, pain to multiple joints, neck pain  Chronic pain syndrome  Using extended release morphine 15 mg 3 times a day  Medication helps improve general activity, mood, walking, sleep, enjoyment of life              Neck Pain   Pertinent negatives include no chest pain and no headaches. Shoulder Pain      Knee Pain   Pertinent negatives include no chest pain and no headaches. Rib Pain    Pertinent negatives include no cough, no headaches, no malaise/fatigue, no nausea, no palpitations and no sputum production. Wrist Pain    Associated symptoms include neck pain. Pertinent negatives include no itching. Review of Systems   Constitutional: Negative for malaise/fatigue and weight loss. HENT: Positive for tinnitus. Eyes: Negative for blurred vision and double vision. Respiratory: Negative for cough and sputum production. Cardiovascular: Negative for chest pain and palpitations. Gastrointestinal: Negative for heartburn and nausea. Genitourinary: Negative for dysuria and urgency. Musculoskeletal: Positive for joint pain and neck pain. Skin: Negative for itching and rash. Neurological: Negative for focal weakness, seizures and headaches. Endo/Heme/Allergies: Negative for environmental allergies. Does not bruise/bleed easily. Psychiatric/Behavioral: Positive for depression. Negative for suicidal ideas. The patient has insomnia. Physical Exam   Constitutional: She appears well-developed and well-nourished. She is cooperative. She does not have a sickly appearance. HENT:   Head: Normocephalic and atraumatic. Right Ear: External ear normal. No drainage. Left Ear: External ear normal. No drainage.    Nose: Nose normal.   Eyes: Lids are normal. Right eye exhibits no discharge. Left eye exhibits no discharge. Right conjunctiva has no hemorrhage. Left conjunctiva has no hemorrhage. Neck: Neck supple. No tracheal deviation present. No thyroid mass present. Pulmonary/Chest: Effort normal. No respiratory distress. Neurological: She is alert. No cranial nerve deficit. Skin: Skin is intact. No rash noted. Psychiatric: Her speech is normal. Her affect is not angry. She does not express inappropriate judgment. Nursing note and vitals reviewed. ASSESSMENT and PLAN  Encounter Diagnoses   Name Primary?  Pain in joint, multiple sites Yes    Psoriatic arthropathy (HCC)     Cervicalgia     Pain in joint of left shoulder     Chronic pain syndrome     Bilateral chronic knee pain     Neck pain, chronic     Generalized osteoarthritis     Degenerative disc disease, cervical     History of anxiety     History of depression    No indicators for recent medication misuse.  reviewed. Pain Meds and Quality Of Life have been reviewed. Nonpharmacologic therapy and non-opioid pharmacologic therapy were considered. If opioid therapy is prescribed, this is only if the expected benefits are anticipated to outweigh risks. Possible changes to treatment plan considered. Support/education given as needed. Today-medications are as listed. No significant changes to medications. Follow up -- 2 months.

## 2017-06-28 NOTE — MR AVS SNAPSHOT
Visit Information Date & Time Provider Department Dept. Phone Encounter #  
 6/28/2017  2:00 PM Zoey Salgado MD [address] [place] [address] for Pain Management   Follow-up Instructions Return in about 2 months (around 8/28/2017). Upcoming Health Maintenance Date Due  
 FOOT EXAM Q1 3/3/1950 MICROALBUMIN Q1 3/3/1950 EYE EXAM RETINAL OR DILATED Q1 3/3/1948 DTaP/Tdap/Td series (1 - Tdap) 3/3/1959 ZOSTER VACCINE AGE 60> 3/3/1998 GLAUCOMA SCREENING Q2Y 3/3/2003 MEDICARE YEARLY EXAM 3/3/2003 Pneumococcal 65+ Low/Medium Risk (2 of 2 - PPSV23) 1/1/2016 LIPID PANEL Q1 9/8/2016 HEMOGLOBIN A1C Q6M 5/25/2017 INFLUENZA AGE 9 TO ADULT 8/1/2017 Allergies as of 6/28/2017  Review Complete On: 6/28/2017 By: Zoey Salgado MD  
  
 Severity Noted Reaction Type Reactions Adhesive  03/24/2016    Itching Augmentin [Amoxicillin-pot Clavulanate]  12/22/2012   Side Effect Nausea and Vomiting, Nausea Only Hydrocodone-acetaminophen  03/24/2016    Unknown (comments) Iodinated Contrast- Oral And Iv Dye  03/16/2016    Shortness of Breath \"MY BODY WENT AGAINST ME\" Nubain [Nalbuphine]    Unknown (comments), Shortness of Breath Labored breathing and chest tightness Current Immunizations  Reviewed on 1/19/2015 Name Date Pneumococcal Vaccine (Unspecified Type) 1/1/2011 Not reviewed this visit You Were Diagnosed With   
  
 Codes Comments Pain in joint, multiple sites    -  Primary ICD-10-CM: M25.50 ICD-9-CM: 719.49 Psoriatic arthropathy ([facility] [address]     ICD-10-CM: L40.50 ICD-9-CM: 696.0 Cervicalgia     ICD-10-CM: M54.2 ICD-9-CM: 723.1 Pain in joint of left shoulder     ICD-10-CM: M25.512 ICD-9-CM: 719.41 Chronic pain syndrome     ICD-10-CM: G89.4 ICD-9-CM: 338.4 Bilateral chronic knee pain     ICD-10-CM: M25.561, M25.562, G89.29 ICD-9-CM: 719.46, 338.29   
 Neck pain, chronic     ICD-10-CM: M54.2, G89.29 ICD-9-CM: 723.1, 338.29 Generalized osteoarthritis     ICD-10-CM: M15.9 ICD-9-CM: 715.00 Degenerative disc disease, cervical     ICD-10-CM: M50.30 ICD-9-CM: 722.4 History of anxiety     ICD-10-CM: Z86.59 
ICD-9-CM: V11.8 History of depression     ICD-10-CM: Z86.59 
ICD-9-CM: V11.8 Vitals BP Pulse Resp Height(growth percentile) Weight(growth percentile) BMI  
 138/82 82 16 5' 8\" (1.727 m) 183 lb (83 kg) 27.83 kg/m2 OB Status Smoking Status Postmenopausal Never Smoker Vitals History BMI and BSA Data Body Mass Index Body Surface Area  
 27.83 kg/m 2 2 m 2 Preferred Pharmacy Pharmacy Name Phone [facility], [address] [phone] Your Updated Medication List  
  
   
This list is accurate as of: 6/28/17  2:26 PM.  Always use your most recent med list.  
  
  
  
  
 amiodarone 200 mg tablet Commonly known as:  CORDARONE Take 1 tablet by mouth daily. amLODIPine 5 mg tablet Commonly known as:  [place] Arielle Take 5 mg by mouth daily. apixaban 5 mg tablet Commonly known as:  Venida Dedrick Take 1 tablet by mouth two (2) times a day. Indications: AF  
  
 atorvastatin 20 mg tablet Commonly known as:  LIPITOR Take 20 mg by mouth daily. COREG 25 mg tablet Generic drug:  carvedilol Take 25 mg by mouth two (2) times daily (with meals). escitalopram oxalate 10 mg tablet Commonly known as:  Michael [place] Take 1 Tab by mouth daily. furosemide 20 mg tablet Commonly known as:  LASIX Take 3 tablet daily with breakfast  
  
 levothyroxine 25 mcg tablet Commonly known as:  SYNTHROID Take  by mouth Daily (before breakfast). lisinopril 20 mg tablet Commonly known as:  Geoffrey Dance Take 20 mg by mouth daily. * morphine IR 30 mg tablet Commonly known as:  MS IR  
 Take 1 Tab by mouth four (4) times daily as needed for Pain. Max Daily Amount: 120 mg.  
  
 * morphine CR 15 mg CR tablet Commonly known as:  MS CONTIN Take 1 Tab by mouth three (3) times daily. Max Daily Amount: 45 mg.  
  
 * morphine CR 15 mg CR tablet Commonly known as:  MS CONTIN Take 1 Tab by mouth three (3) times daily. Max Daily Amount: 45 mg. Start taking on:  7/27/2017  
  
 naloxone 4 mg/actuation Spry 4 mg by Nasal route as needed. oxyCODONE-acetaminophen 5-325 mg per tablet Commonly known as:  PERCOCET Take 1 Tab by mouth every six (6) hours as needed for Pain. Max Daily Amount: 4 Tabs. potassium chloride 20 mEq tablet Commonly known as:  K-DUR, KLOR-CON Take 0.5 Tabs by mouth daily. spironolactone 25 mg tablet Commonly known as:  ALDACTONE Take 1 Tab by mouth daily. * Notice: This list has 3 medication(s) that are the same as other medications prescribed for you. Read the directions carefully, and ask your doctor or other care provider to review them with you. Prescriptions Printed Refills  
 morphine CR (MS CONTIN) 15 mg CR tablet 0 Sig: Take 1 Tab by mouth three (3) times daily. Max Daily Amount: 45 mg.  
 Class: Print Route: Oral  
 morphine CR (MS CONTIN) 15 mg CR tablet 0 Starting on: 7/27/2017 Sig: Take 1 Tab by mouth three (3) times daily. Max Daily Amount: 45 mg.  
 Class: Print Route: Oral  
  
Follow-up Instructions Return in about 2 months (around 8/28/2017). Introducing [facility] & HEALTH SERVICES! [employer] introduces [employer] patient portal. Now you can access parts of your medical record, email your doctor's office, and request medication refills online. 1. In your internet browser, go to [email or web address] 2. Click on the First Time User? Click Here link in the Sign In box. You will see the New Member Sign Up page. 3. Enter your [employer] Access Code exactly as it appears below. You will not need to use this code after youve completed the sign-up process. If you do not sign up before the expiration date, you must request a new code. · [employer] Access Code: [email or web address] Expires: 7/10/2017  1:56 PM 
 
4. Enter the last four digits of your Social Security Number (xxxx) and Date of Birth (mm/dd/yyyy) as indicated and click Submit. You will be taken to the next sign-up page. 5. Create a [employer] ID. This will be your [employer] login ID and cannot be changed, so think of one that is secure and easy to remember. 6. Create a [employer] password. You can change your password at any time. 7. Enter your Password Reset Question and Answer. This can be used at a later time if you forget your password. 8. Enter your e-mail address. You will receive e-mail notification when new information is available in [address] 9. Click Sign Up. You can now view and download portions of your medical record. 10. Click the Download Summary menu link to download a portable copy of your medical information. If you have questions, please visit the Frequently Asked Questions section of the [employer] website. Remember, [employer] is NOT to be used for urgent needs. For medical emergencies, dial 911. Now available from your iPhone and Android! Please provide this summary of care documentation to your next provider. Your primary care clinician is listed as NONE. If you have any questions after today's visit, please call [phone].

## 2017-08-24 ENCOUNTER — OFFICE VISIT (OUTPATIENT)
Dept: PAIN MANAGEMENT | Age: 77
End: 2017-08-24

## 2017-08-24 VITALS
WEIGHT: 184 LBS | RESPIRATION RATE: 16 BRPM | DIASTOLIC BLOOD PRESSURE: 92 MMHG | HEART RATE: 89 BPM | TEMPERATURE: 98 F | BODY MASS INDEX: 27.89 KG/M2 | SYSTOLIC BLOOD PRESSURE: 168 MMHG | HEIGHT: 68 IN

## 2017-08-24 DIAGNOSIS — G89.4 CHRONIC PAIN SYNDROME: ICD-10-CM

## 2017-08-24 DIAGNOSIS — L40.50 PSORIATIC ARTHROPATHY (HCC): ICD-10-CM

## 2017-08-24 DIAGNOSIS — M25.50 POLYARTHRALGIA: Primary | ICD-10-CM

## 2017-08-24 DIAGNOSIS — M54.2 CERVICALGIA: ICD-10-CM

## 2017-08-24 DIAGNOSIS — M25.569 ARTHRALGIA OF LOWER LEG, UNSPECIFIED LATERALITY: ICD-10-CM

## 2017-08-24 DIAGNOSIS — M25.50 PAIN IN JOINT, MULTIPLE SITES: ICD-10-CM

## 2017-08-24 DIAGNOSIS — M25.512 PAIN IN JOINT OF LEFT SHOULDER: ICD-10-CM

## 2017-08-24 RX ORDER — MORPHINE SULFATE 30 MG/1
30 TABLET, FILM COATED, EXTENDED RELEASE ORAL 3 TIMES DAILY
Qty: 90 TAB | Refills: 0 | Status: SHIPPED | OUTPATIENT
Start: 2017-08-24 | End: 2017-08-24 | Stop reason: SDUPTHER

## 2017-08-24 RX ORDER — MORPHINE SULFATE 30 MG/1
30 TABLET, FILM COATED, EXTENDED RELEASE ORAL 3 TIMES DAILY
Qty: 90 TAB | Refills: 0 | Status: SHIPPED | OUTPATIENT
Start: 2017-09-23 | End: 2017-10-17

## 2017-08-24 NOTE — PATIENT INSTRUCTIONS

## 2017-08-24 NOTE — PROGRESS NOTES
HISTORY OF PRESENT ILLNESS  Charbel Rhoades is a 76 y.o. female. HPI Comments: Visit survey reviewed  Chief complaint, polyarthralgia, pain to multiple joints  Chronic pain syndrome  We are prescribing extended release morphine 15 mg 3 times a day  She has been using this from our clinic for about 4 or 5 months now. She reports that she has never been impressed that this is been very helpful. Her daughter who was present today supports that opinion. Patient would like to try a higher dose and states she has been on higher doses in the past.  I will prescribe extended release morphine, 60 mg 3 times a day. She denies any recent fall. She does not feel that the medication increases any chance for fall. She reports she does have a walker and a cane and a wheelchair at home. I reminded her to use an assistive device if she feels she might have a fall. Review of Systems   Constitutional: Negative for malaise/fatigue and weight loss. HENT: Positive for tinnitus. Eyes: Negative for blurred vision and double vision. Respiratory: Negative for cough and sputum production. Cardiovascular: Negative for chest pain and palpitations. Gastrointestinal: Negative for heartburn and nausea. Genitourinary: Negative for dysuria and urgency. Musculoskeletal: Positive for joint pain and neck pain. Skin: Negative for itching and rash. Neurological: Negative for focal weakness, seizures and headaches. Endo/Heme/Allergies: Negative for environmental allergies. Does not bruise/bleed easily. Psychiatric/Behavioral: Positive for depression. Negative for suicidal ideas. The patient has insomnia. Physical Exam   Constitutional: She appears well-developed and well-nourished. She is cooperative. She does not have a sickly appearance. HENT:   Head: Normocephalic and atraumatic. Right Ear: External ear normal. No drainage. Left Ear: External ear normal. No drainage.    Nose: Nose normal.   Eyes: Lids are normal. Right eye exhibits no discharge. Left eye exhibits no discharge. Right conjunctiva has no hemorrhage. Left conjunctiva has no hemorrhage. Neck: Neck supple. No tracheal deviation present. No thyroid mass present. Pulmonary/Chest: Effort normal. No respiratory distress. Neurological: She is alert. No cranial nerve deficit. Skin: Skin is intact. No rash noted. Psychiatric: Her speech is normal. Her affect is not angry. She does not express inappropriate judgment. Nursing note and vitals reviewed. ASSESSMENT and PLAN  Encounter Diagnoses   Name Primary?  Polyarthralgia Yes    Psoriatic arthropathy (HCC)     Arthralgia of lower leg, unspecified laterality     Pain in joint, multiple sites     Cervicalgia     Pain in joint of left shoulder     Chronic pain syndrome    No indicators for recent medication misuse.  reviewed. Pain Meds and Quality Of Life have been reviewed. Nonpharmacologic therapy and non-opioid pharmacologic therapy were considered. If opioid therapy is prescribed, this is only if the expected benefits are anticipated to outweigh risks. Possible changes to treatment plan considered. Support/education given as needed. Today-medications are as listed. changes to medications. Follow up -- 2 months.

## 2017-08-24 NOTE — MR AVS SNAPSHOT
Visit Information Date & Time Provider Department Dept. Phone Encounter #  
 8/24/2017  2:00 PM Sweta Riojas MD [facility] [address] for Pain Management [phone] Follow-up Instructions Return in about 2 months (around 10/24/2017). Follow-up and Disposition History Upcoming Health Maintenance Date Due  
 FOOT EXAM Q1 3/3/1950 MICROALBUMIN Q1 3/3/1948 EYE EXAM RETINAL OR DILATED Q1 3/3/1948 DTaP/Tdap/Td series (1 - Tdap) 3/3/1959 ZOSTER VACCINE AGE 60> 1/3/1998 GLAUCOMA SCREENING Q2Y 3/3/2003 MEDICARE YEARLY EXAM 3/3/2003 Pneumococcal 65+ Low/Medium Risk (2 of 2 - PPSV23) 1/1/2016 LIPID PANEL Q1 9/8/2016 HEMOGLOBIN A1C Q6M 5/25/2017 INFLUENZA AGE 9 TO ADULT 8/1/2017 Allergies as of 8/24/2017  Review Complete On: 8/24/2017 By: Sweta Riojas MD  
  
 Severity Noted Reaction Type Reactions Adhesive  03/24/2016    Itching Augmentin [Amoxicillin-pot Clavulanate]  12/22/2012   Side Effect Nausea and Vomiting, Nausea Only Hydrocodone-acetaminophen  03/24/2016    Unknown (comments) Iodinated Contrast- Oral And Iv Dye  03/16/2016    Shortness of Breath \"MY BODY WENT AGAINST ME\" Nubain [Nalbuphine]    Unknown (comments), Shortness of Breath Labored breathing and chest tightness Current Immunizations  Reviewed on 1/19/2015 Name Date Pneumococcal Vaccine (Unspecified Type) 1/1/2011 Not reviewed this visit You Were Diagnosed With   
  
 Codes Comments Polyarthralgia    -  Primary ICD-10-CM: M25.50 ICD-9-CM: 719.49 Psoriatic arthropathy ([facility]     ICD-10-CM: L40.50 ICD-9-CM: 696.0 Arthralgia of lower leg, unspecified laterality     ICD-10-CM: M25.569 ICD-9-CM: 719.46 Pain in joint, multiple sites     ICD-10-CM: M25.50 ICD-9-CM: 719.49 Cervicalgia     ICD-10-CM: M54.2 ICD-9-CM: 723.1 Pain in joint of left shoulder     ICD-10-CM: M25.512 ICD-9-CM: 719.41   
 Chronic pain syndrome     ICD-10-CM: G89.4 ICD-9-CM: 338. 4 Vitals BP Pulse Temp Resp Height(growth percentile) Weight(growth percentile) (!) 168/92 89 98 °F (36.7 °C) 16 5' 8\" (1.727 m) 184 lb (83.5 kg) BMI OB Status Smoking Status 27.98 kg/m2 Postmenopausal Never Smoker Vitals History BMI and BSA Data Body Mass Index Body Surface Area  
 27.98 kg/m 2 2 m 2 Preferred Pharmacy Pharmacy Name Phone [facility], [address] [phone] Your Updated Medication List  
  
   
This list is accurate as of: 8/24/17  2:37 PM.  Always use your most recent med list.  
  
  
  
  
 amiodarone 200 mg tablet Commonly known as:  CORDARONE Take 1 tablet by mouth daily. amLODIPine 5 mg tablet Commonly known as:  Orval Leatha Take 5 mg by mouth daily. apixaban 5 mg tablet Commonly known as:  Lenord Car Take 1 tablet by mouth two (2) times a day. Indications: AF  
  
 atorvastatin 20 mg tablet Commonly known as:  LIPITOR Take 20 mg by mouth daily. COREG 25 mg tablet Generic drug:  carvedilol Take 25 mg by mouth two (2) times daily (with meals). escitalopram oxalate 10 mg tablet Commonly known as:  Bringhurst Roof Take 1 Tab by mouth daily. furosemide 20 mg tablet Commonly known as:  LASIX Take 3 tablet daily with breakfast  
  
 levothyroxine 25 mcg tablet Commonly known as:  SYNTHROID Take  by mouth Daily (before breakfast). lisinopril 20 mg tablet Commonly known as:  Alexis Handing Take 20 mg by mouth daily. * morphine IR 30 mg tablet Commonly known as:  MS IR Take 1 Tab by mouth four (4) times daily as needed for Pain. Max Daily Amount: 120 mg.  
  
 * morphine CR 30 mg CR tablet Commonly known as:  MS CONTIN Take 1 Tab by mouth three (3) times daily. Max Daily Amount: 90 mg. Start taking on:  9/23/2017 naloxone 4 mg/actuation Spry 4 mg by Nasal route as needed. oxyCODONE-acetaminophen 5-325 mg per tablet Commonly known as:  PERCOCET Take 1 Tab by mouth every six (6) hours as needed for Pain. Max Daily Amount: 4 Tabs. potassium chloride 20 mEq tablet Commonly known as:  K-DUR, KLOR-CON Take 0.5 Tabs by mouth daily. spironolactone 25 mg tablet Commonly known as:  ALDACTONE Take 1 Tab by mouth daily. * Notice: This list has 2 medication(s) that are the same as other medications prescribed for you. Read the directions carefully, and ask your doctor or other care provider to review them with you. Prescriptions Printed Refills  
 morphine CR (MS CONTIN) 30 mg CR tablet 0 Starting on: 9/23/2017 Sig: Take 1 Tab by mouth three (3) times daily. Max Daily Amount: 90 mg.  
 Class: Print Route: Oral  
  
Follow-up Instructions Return in about 2 months (around 10/24/2017). Patient Instructions Preventing Falls: Care Instructions Your Care Instructions Getting around your home safely can be a challenge if you have injuries or health problems that make it easy for you to fall. Loose rugs and furniture in walkways are among the dangers for many older people who have problems walking or who have poor eyesight. People who have conditions such as arthritis, osteoporosis, or dementia also have to be careful not to fall. You can make your home safer with a few simple measures. Follow-up care is a key part of your treatment and safety. Be sure to make and go to all appointments, and call your doctor if you are having problems. It's also a good idea to know your test results and keep a list of the medicines you take. How can you care for yourself at home? Taking care of yourself · You may get dizzy if you do not drink enough water.  To prevent dehydration, drink plenty of fluids, enough so that your urine is light yellow or clear like water. Choose water and other caffeine-free clear liquids. If you have kidney, heart, or liver disease and have to limit fluids, talk with your doctor before you increase the amount of fluids you drink. · Exercise regularly to improve your strength, muscle tone, and balance. Walk if you can. Swimming may be a good choice if you cannot walk easily. · Have your vision and hearing checked each year or any time you notice a change. If you have trouble seeing and hearing, you might not be able to avoid objects and could lose your balance. · Know the side effects of the medicines you take. Ask your doctor or pharmacist whether the medicines you take can affect your balance. Sleeping pills or sedatives can affect your balance. · Limit the amount of alcohol you drink. Alcohol can impair your balance and other senses. · Ask your doctor whether calluses or corns on your feet need to be removed. If you wear loose-fitting shoes because of calluses or corns, you can lose your balance and fall. · Talk to your doctor if you have numbness in your feet. Preventing falls at home · Remove raised doorway thresholds, throw rugs, and clutter. Repair loose carpet or raised areas in the floor. · Move furniture and electrical cords to keep them out of walking paths. · Use nonskid floor wax, and wipe up spills right away, especially on ceramic tile floors. · If you use a walker or cane, put rubber tips on it. If you use crutches, clean the bottoms of them regularly with an abrasive pad, such as steel wool. · Keep your house well lit, especially [facility], and outside walkways. Use night-lights in areas such as hallways and bathrooms. Add extra light switches or use remote switches (such as switches that go on or off when you clap your hands) to make it easier to turn lights on if you have to get up during the night. · Install sturdy handrails on stairways. · Move items in your cabinets so that the things you use a lot are on the lower shelves (about waist level). · Keep a cordless phone and a flashlight with new batteries by your bed. If possible, put a phone in each of the main rooms of your house, or carry a cell phone in case you fall and cannot reach a phone. Or, you can wear a device around your neck or wrist. You push a button that sends a signal for help. · Wear low-heeled shoes that fit well and give your feet good support. Use footwear with nonskid soles. Check the heels and soles of your shoes for wear. Repair or replace worn heels or soles. · Do not wear socks without shoes on wood floors. · Walk on the grass when the sidewalks are slippery. If you live in an area that gets snow and ice in the winter, sprinkle salt on slippery steps and sidewalks. Preventing falls in the bath · Install grab bars and nonskid mats inside and outside your shower or tub and near the toilet and sinks. · Use shower chairs and bath benches. · Use a hand-held shower head that will allow you to sit while showering. · Get into a tub or shower by putting the weaker leg in first. Get out of a tub or shower with your strong side first. 
· Repair loose toilet seats and consider installing a raised toilet seat to make getting on and off the toilet easier. · Keep your bathroom door unlocked while you are in the shower. Where can you learn more? Go to http://yousuf-lamine.info/. Enter [phone] [id number] [phone] in the search box to learn more about \"Preventing Falls: Care Instructions. \" Current as of: August 4, 2016 Content Version: 11.3 © 2403-7449 [employer], [employer]ang. Care instructions adapted under license by [employer] (which disclaims liability or warranty for this information).  If you have questions about a medical condition or this instruction, always ask your healthcare professional. Cristian [employer] disclaims any warranty or liability for your use of this information. Introducing [facility]! [employer] introduces Hotchalk patient portal. Now you can access parts of your medical record, email your doctor's office, and request medication refills online. 1. In your internet browser, go to [email or web address] 2. Click on the First Time User? Click Here link in the Sign In box. You will see the New Member Sign Up page. 3. Enter your Hotchalk Access Code exactly as it appears below. You will not need to use this code after youve completed the sign-up process. If you do not sign up before the expiration date, you must request a new code. · Hotchalk Access Code: [email or web address] Expires: 11/22/2017  2:37 PM 
 
4. Enter the last four digits of your Social Security Number (xxxx) and Date of Birth (mm/dd/yyyy) as indicated and click Submit. You will be taken to the next sign-up page. 5. Create a Hotchalk ID. This will be your Hotchalk login ID and cannot be changed, so think of one that is secure and easy to remember. 6. Create a Hotchalk password. You can change your password at any time. 7. Enter your Password Reset Question and Answer. This can be used at a later time if you forget your password. 8. Enter your e-mail address. You will receive e-mail notification when new information is available in [address] 9. Click Sign Up. You can now view and download portions of your medical record. 10. Click the Download Summary menu link to download a portable copy of your medical information. If you have questions, please visit the Frequently Asked Questions section of the Hotchalk website. Remember, Hotchalk is NOT to be used for urgent needs. For medical emergencies, dial 911. Now available from your iPhone and Android! Please provide this summary of care documentation to your next provider. Your primary care clinician is listed as NONE. If you have any questions after today's visit, please call [phone].

## 2017-08-24 NOTE — PROGRESS NOTES
Nursing Notes    Patient presents to the office today in follow-up. Reviewed medications with counts as follows:    Rx Date filled Qty Dispensed Pill Count Last Dose Short   Morphine er 15 mg 8/6/17 90 28 Today. 1 no   Ms. Nidia Mercer has a reminder for a \"due or due soon\" health maintenance. I have asked that she contact her primary care provider for follow-up on this health maintenance. Comments: (+) fall risk. Provider made aware    POC UDS was not performed in office today    Any new labs or imaging since last appointment? NO    Have you been to an emergency room (ER) or urgent care clinic since your last visit? NO            Have you been hospitalized since your last visit? NO     If yes, where, when, and reason for visit? Have you seen or consulted any other health care providers outside of the [address] [place] [address]  since your last visit? NO     If yes, where, when, and reason for visit?

## 2017-10-17 ENCOUNTER — OFFICE VISIT (OUTPATIENT)
Dept: PAIN MANAGEMENT | Age: 77
End: 2017-10-17

## 2017-10-17 VITALS
HEART RATE: 75 BPM | DIASTOLIC BLOOD PRESSURE: 67 MMHG | TEMPERATURE: 96 F | BODY MASS INDEX: 27.98 KG/M2 | RESPIRATION RATE: 22 BRPM | WEIGHT: 184 LBS | SYSTOLIC BLOOD PRESSURE: 153 MMHG

## 2017-10-17 DIAGNOSIS — L40.50 PSORIATIC ARTHROPATHY (HCC): ICD-10-CM

## 2017-10-17 DIAGNOSIS — G89.29 BILATERAL CHRONIC KNEE PAIN: ICD-10-CM

## 2017-10-17 DIAGNOSIS — M50.30 DEGENERATIVE DISC DISEASE, CERVICAL: ICD-10-CM

## 2017-10-17 DIAGNOSIS — G89.4 CHRONIC PAIN SYNDROME: ICD-10-CM

## 2017-10-17 DIAGNOSIS — M47.812 OSTEOARTHRITIS OF CERVICAL SPINE, UNSPECIFIED SPINAL OSTEOARTHRITIS COMPLICATION STATUS: ICD-10-CM

## 2017-10-17 DIAGNOSIS — M25.50 POLYARTHRALGIA: Primary | ICD-10-CM

## 2017-10-17 DIAGNOSIS — Z86.59 HISTORY OF DEPRESSION: ICD-10-CM

## 2017-10-17 DIAGNOSIS — Z79.899 ENCOUNTER FOR LONG-TERM (CURRENT) USE OF HIGH-RISK MEDICATION: ICD-10-CM

## 2017-10-17 DIAGNOSIS — Z86.59 HISTORY OF ANXIETY: ICD-10-CM

## 2017-10-17 DIAGNOSIS — M15.9 GENERALIZED OSTEOARTHRITIS: ICD-10-CM

## 2017-10-17 DIAGNOSIS — M54.2 NECK PAIN, CHRONIC: ICD-10-CM

## 2017-10-17 DIAGNOSIS — M25.562 BILATERAL CHRONIC KNEE PAIN: ICD-10-CM

## 2017-10-17 DIAGNOSIS — M25.561 BILATERAL CHRONIC KNEE PAIN: ICD-10-CM

## 2017-10-17 DIAGNOSIS — G89.29 NECK PAIN, CHRONIC: ICD-10-CM

## 2017-10-17 LAB
ALCOHOL UR POC: NORMAL
AMPHETAMINES UR POC: NEGATIVE
BARBITURATES UR POC: NEGATIVE
BENZODIAZEPINES UR POC: NORMAL
BUPRENORPHINE UR POC: NORMAL
CANNABINOIDS UR POC: NEGATIVE
CARISOPRODOL UR POC: NORMAL
COCAINE UR POC: NEGATIVE
FENTANYL UR POC: NORMAL
MDMA/ECSTASY UR POC: NEGATIVE
METHADONE UR POC: NEGATIVE
METHAMPHETAMINE UR POC: NEGATIVE
METHYLPHENIDATE UR POC: NEGATIVE
OPIATES UR POC: NORMAL
OXYCODONE UR POC: NEGATIVE
PHENCYCLIDINE UR POC: NORMAL
PROPOXYPHENE UR POC: NORMAL
TRAMADOL UR POC: NORMAL
TRICYCLICS UR POC: NEGATIVE

## 2017-10-17 RX ORDER — MORPHINE SULFATE 30 MG/1
30 TABLET ORAL
Qty: 90 TAB | Refills: 0 | Status: SHIPPED | OUTPATIENT
Start: 2017-11-16 | End: 2018-04-25 | Stop reason: SDUPTHER

## 2017-10-17 RX ORDER — MORPHINE SULFATE 30 MG/1
30 TABLET ORAL
Qty: 90 TAB | Refills: 0 | Status: SHIPPED | OUTPATIENT
Start: 2017-10-17 | End: 2017-10-17

## 2017-10-17 NOTE — PROGRESS NOTES
HISTORY OF PRESENT ILLNESS  Kyrie Singh is a 76 y.o. female. HPI Comments: Visit survey reviewed  Chief complaint- chronic pain syndrome- polyarthralgia, neck pain  She has been using extended release morphine 30 mg 3 times a day  Although it is slightly helpful, she remembers immediate release morphine being more helpful. She would like to change extended release over to immediate release. I have agreed to give this a try. Immediate release, 30 mg, 3 times a day as needed    No new significant side effects reported  Medication continues to help improve quality of life. Medications reviewed including risk and benefits. Recent average level of pain-7,8    Measurement of clinical outcome for chronic pain patient/ SPAASMS Score Card-            Information reviewed and will be scanned. Total score today-11      Review of Systems   Constitutional: Negative for malaise/fatigue and weight loss. HENT: Positive for tinnitus. Eyes: Negative for blurred vision and double vision. Respiratory: Negative for cough and sputum production. Cardiovascular: Negative for chest pain and palpitations. Gastrointestinal: Negative for heartburn and nausea. Genitourinary: Negative for dysuria and urgency. Musculoskeletal: Positive for joint pain and neck pain. Skin: Negative for itching and rash. Neurological: Negative for focal weakness, seizures and headaches. Endo/Heme/Allergies: Negative for environmental allergies. Does not bruise/bleed easily. Psychiatric/Behavioral: Positive for depression. Negative for suicidal ideas. The patient has insomnia. Physical Exam   Constitutional: She appears well-developed and well-nourished. She is cooperative. She does not have a sickly appearance. HENT:   Head: Normocephalic and atraumatic. Right Ear: External ear normal. No drainage. Left Ear: External ear normal. No drainage.    Nose: Nose normal.   Eyes: Lids are normal. Right eye exhibits no discharge. Left eye exhibits no discharge. Right conjunctiva has no hemorrhage. Left conjunctiva has no hemorrhage. Neck: Neck supple. No tracheal deviation present. No thyroid mass present. Pulmonary/Chest: Effort normal. No respiratory distress. Neurological: She is alert. No cranial nerve deficit. Skin: Skin is intact. No rash noted. Psychiatric: Her speech is normal. Her affect is not angry. She does not express inappropriate judgment. Nursing note and vitals reviewed. ASSESSMENT and PLAN  Encounter Diagnoses   Name Primary?  Polyarthralgia Yes    Encounter for long-term (current) use of high-risk medication     Psoriatic arthropathy (HCC)     Chronic pain syndrome     Bilateral chronic knee pain     Neck pain, chronic     Generalized osteoarthritis     Degenerative disc disease, cervical     Osteoarthritis of cervical spine, unspecified spinal osteoarthritis complication status     History of anxiety     History of depression    No indicators for recent medication misuse.  reviewed. Pain Meds and Quality Of Life have been reviewed. Nonpharmacologic therapy and non-opioid pharmacologic therapy were considered. If opioid therapy is prescribed, this is only if the expected benefits are anticipated to outweigh risks. Possible changes to treatment plan considered. Support/education given as needed. Today-medications are as listed. changes to medications. Follow up -- 2 months.    Urine test today  Prescription monitoring program reviewed

## 2017-10-17 NOTE — PROGRESS NOTES
Nursing Notes    Patient presents to the office today in follow-up. Patient rates her pain at 7/10 on the numerical pain scale. Reviewed medications with counts as follows:    Rx Date filled Qty Dispensed Pill Count Last Dose Short     Morphine sulfate 30mg ER 09/27/17 90 21 This am  Yes ( one day short )                                             Comments:     POC UDS was performed in office today    Any new labs or imaging since last appointment? YES; labwork     Have you been to an emergency room (ER) or urgent care clinic since your last visit? NO            Have you been hospitalized since your last visit? NO     If yes, where, when, and reason for visit? Have you seen or consulted any other health care providers outside of the [address] [place] [address]  since your last visit? YES     If yes, where, when, and reason for visit? pcp       HM deferred to pcp. Patient noted to be short on medications , morphine sulfate 30mg ER ( one day short ) this office visit; advised to follow dosing schedule [facility].

## 2017-10-17 NOTE — MR AVS SNAPSHOT
Visit Information Date & Time Provider Department Dept. Phone Encounter #  
 10/17/2017  3:45 PM Tiara Tracy MD [address] [place] [address] for Pain Management [phone] Follow-up Instructions Return in about 2 months (around 12/17/2017). Follow-up and Disposition History Upcoming Health Maintenance Date Due  
 FOOT EXAM Q1 3/3/1950 MICROALBUMIN Q1 3/3/1950 EYE EXAM RETINAL OR DILATED Q1 3/3/1948 DTaP/Tdap/Td series (1 - Tdap) 3/3/1959 ZOSTER VACCINE AGE 60> 1/3/1998 GLAUCOMA SCREENING Q2Y 3/3/2003 MEDICARE YEARLY EXAM 3/3/2003 Pneumococcal 65+ Low/Medium Risk (2 of 2 - PPSV23) 1/1/2016 LIPID PANEL Q1 9/8/2016 HEMOGLOBIN A1C Q6M 5/25/2017 INFLUENZA AGE 9 TO ADULT 8/1/2017 Allergies as of 10/17/2017  Review Complete On: 10/17/2017 By: Tiara Tracy MD  
  
 Severity Noted Reaction Type Reactions Adhesive  03/24/2016    Itching Augmentin [Amoxicillin-pot Clavulanate]  12/22/2012   Side Effect Nausea and Vomiting, Nausea Only Hydrocodone-acetaminophen  03/24/2016    Unknown (comments) Iodinated Contrast- Oral And Iv Dye  03/16/2016    Shortness of Breath \"MY BODY WENT AGAINST ME\" Nubain [Nalbuphine]    Unknown (comments), Shortness of Breath Labored breathing and chest tightness Current Immunizations  Reviewed on 1/19/2015 Name Date Pneumococcal Vaccine (Unspecified Type) 1/1/2011 Not reviewed this visit You Were Diagnosed With   
  
 Codes Comments Polyarthralgia    -  Primary ICD-10-CM: M25.50 ICD-9-CM: 719.49 Encounter for long-term (current) use of high-risk medication     ICD-10-CM: Z79.899 ICD-9-CM: V58.69 [facility]     ICD-10-CM: L40.50 ICD-9-CM: 696.0 Chronic pain syndrome     ICD-10-CM: G89.4 ICD-9-CM: 338.4 Bilateral chronic knee pain     ICD-10-CM: M25.561, M25.562, G89.29 ICD-9-CM: 719.46, 338.29   
 Neck pain, chronic     ICD-10-CM: M54.2, G89.29 ICD-9-CM: 723.1, 338.29 Generalized osteoarthritis     ICD-10-CM: M15.9 ICD-9-CM: 715.00 Degenerative disc disease, cervical     ICD-10-CM: M50.30 ICD-9-CM: 722.4 Osteoarthritis of cervical spine, unspecified spinal osteoarthritis complication status     WEJ-57-QW: D33.995 ICD-9-CM: 721.0 History of anxiety     ICD-10-CM: Z86.59 
ICD-9-CM: V11.8 History of depression     ICD-10-CM: Z86.59 
ICD-9-CM: V11.8 Vitals BP Pulse Temp Resp Weight(growth percentile) BMI  
 153/67 75 96 °F (35.6 °C) 22 184 lb (83.5 kg) 27.98 kg/m2 OB Status Smoking Status Postmenopausal Never Smoker Vitals History BMI and BSA Data Body Mass Index Body Surface Area  
 27.98 kg/m 2 2 m 2 Preferred Pharmacy Pharmacy Name Phone [facility], [address] [place] [address] [phone] Your Updated Medication List  
  
   
This list is accurate as of: 10/17/17  4:38 PM.  Always use your most recent med list.  
  
  
  
  
 amiodarone 200 mg tablet Commonly known as:  CORDARONE Take 1 tablet by mouth daily. amLODIPine 5 mg tablet Commonly known as:  Clarita Isabella Take 5 mg by mouth daily. apixaban 5 mg tablet Commonly known as:  Opal Shield Take 1 tablet by mouth two (2) times a day. Indications: AF  
  
 atorvastatin 20 mg tablet Commonly known as:  LIPITOR Take 20 mg by mouth daily. COREG 25 mg tablet Generic drug:  carvedilol Take 25 mg by mouth two (2) times daily (with meals). escitalopram oxalate 10 mg tablet Commonly known as:  Driss Robert Take 1 Tab by mouth daily. furosemide 20 mg tablet Commonly known as:  LASIX Take 3 tablet daily with breakfast  
  
 levothyroxine 25 mcg tablet Commonly known as:  SYNTHROID Take  by mouth Daily (before breakfast). lisinopril 20 mg tablet Commonly known as:  Ora Bee Take 20 mg by mouth daily. * morphine IR 30 mg tablet Commonly known as:  MS IR Take 1 Tab by mouth four (4) times daily as needed for Pain. Max Daily Amount: 120 mg.  
  
 * morphine IR 30 mg tablet Commonly known as:  MS IR Take 1 Tab by mouth three (3) times daily as needed for Pain. Max Daily Amount: 90 mg. Start taking on:  11/16/2017  
  
 naloxone 4 mg/actuation nasal spray Commonly known as:  NARCAN  
4 mg by Nasal route as needed. oxyCODONE-acetaminophen 5-325 mg per tablet Commonly known as:  PERCOCET Take 1 Tab by mouth every six (6) hours as needed for Pain. Max Daily Amount: 4 Tabs. potassium chloride 20 mEq tablet Commonly known as:  K-DUR, KLOR-CON Take 0.5 Tabs by mouth daily. spironolactone 25 mg tablet Commonly known as:  ALDACTONE Take 1 Tab by mouth daily. * Notice: This list has 2 medication(s) that are the same as other medications prescribed for you. Read the directions carefully, and ask your doctor or other care provider to review them with you. Prescriptions Printed Refills  
 morphine IR (MS IR) 30 mg tablet 0 Starting on: 11/16/2017 Sig: Take 1 Tab by mouth three (3) times daily as needed for Pain. Max Daily Amount: 90 mg.  
 Class: Print Route: Oral  
  
We Performed the Following AMB POC DRUG SCREEN () [ \[facility] [place] [facility]\""] DRUG SCREEN [ERF26438 Custom] Follow-up Instructions Return in about 2 months (around 12/17/2017). Introducing [facility]! [employer] introduces [employer] patient portal. Now you can access parts of your medical record, email your doctor's office, and request medication refills online. 1. In your internet browser, go to [email or web address] 2. Click on the First Time User? Click Here link in the Sign In box. You will see the New Member Sign Up page. 3. Enter your [employer] Access Code exactly as it appears below. You will not need to use this code after youve completed the sign-up process. If you do not sign up before the expiration date, you must request a new code. · [employer] Access Code: [email or web address] Expires: 11/22/2017  2:37 PM 
 
4. Enter the last four digits of your Social Security Number (xxxx) and Date of Birth (mm/dd/yyyy) as indicated and click Submit. You will be taken to the next sign-up page. 5. Create a [employer] ID. This will be your [employer] login ID and cannot be changed, so think of one that is secure and easy to remember. 6. Create a [employer] password. You can change your password at any time. 7. Enter your Password Reset Question and Answer. This can be used at a later time if you forget your password. 8. Enter your e-mail address. You will receive e-mail notification when new information is available in [address] 9. Click Sign Up. You can now view and download portions of your medical record. 10. Click the Download Summary menu link to download a portable copy of your medical information. If you have questions, please visit the Frequently Asked Questions section of the [employer] website. Remember, [employer] is NOT to be used for urgent needs. For medical emergencies, dial 911. Now available from your iPhone and Android! Please provide this summary of care documentation to your next provider. Your primary care clinician is listed as NONE. If you have any questions after today's visit, please call [phone].

## 2017-12-13 ENCOUNTER — OFFICE VISIT (OUTPATIENT)
Dept: PAIN MANAGEMENT | Age: 77
End: 2017-12-13

## 2017-12-13 VITALS
WEIGHT: 184 LBS | RESPIRATION RATE: 22 BRPM | SYSTOLIC BLOOD PRESSURE: 141 MMHG | DIASTOLIC BLOOD PRESSURE: 75 MMHG | HEART RATE: 85 BPM | TEMPERATURE: 95.1 F | BODY MASS INDEX: 27.98 KG/M2

## 2017-12-13 DIAGNOSIS — M54.2 CERVICALGIA: ICD-10-CM

## 2017-12-13 DIAGNOSIS — Z86.59 HISTORY OF ANXIETY: ICD-10-CM

## 2017-12-13 DIAGNOSIS — G89.4 CHRONIC PAIN SYNDROME: ICD-10-CM

## 2017-12-13 DIAGNOSIS — M25.50 POLYARTHRALGIA: ICD-10-CM

## 2017-12-13 DIAGNOSIS — G89.29 NECK PAIN, CHRONIC: ICD-10-CM

## 2017-12-13 DIAGNOSIS — Z86.59 HISTORY OF DEPRESSION: ICD-10-CM

## 2017-12-13 DIAGNOSIS — M54.2 NECK PAIN, CHRONIC: ICD-10-CM

## 2017-12-13 DIAGNOSIS — G89.29 BILATERAL CHRONIC KNEE PAIN: ICD-10-CM

## 2017-12-13 DIAGNOSIS — M50.30 DEGENERATIVE DISC DISEASE, CERVICAL: ICD-10-CM

## 2017-12-13 DIAGNOSIS — M25.561 BILATERAL CHRONIC KNEE PAIN: ICD-10-CM

## 2017-12-13 DIAGNOSIS — M25.50 PAIN IN JOINT, MULTIPLE SITES: Primary | ICD-10-CM

## 2017-12-13 DIAGNOSIS — M15.9 GENERALIZED OSTEOARTHRITIS: ICD-10-CM

## 2017-12-13 DIAGNOSIS — L40.50 PSORIATIC ARTHROPATHY (HCC): ICD-10-CM

## 2017-12-13 DIAGNOSIS — M25.562 BILATERAL CHRONIC KNEE PAIN: ICD-10-CM

## 2017-12-13 DIAGNOSIS — M47.812 OSTEOARTHRITIS OF CERVICAL SPINE, UNSPECIFIED SPINAL OSTEOARTHRITIS COMPLICATION STATUS: ICD-10-CM

## 2017-12-13 RX ORDER — MORPHINE SULFATE 30 MG/1
30 TABLET ORAL
Qty: 90 TAB | Refills: 0 | Status: SHIPPED | OUTPATIENT
Start: 2018-01-12 | End: 2018-01-23 | Stop reason: SDUPTHER

## 2017-12-13 RX ORDER — MORPHINE SULFATE 30 MG/1
30 TABLET ORAL
Qty: 90 TAB | Refills: 0 | Status: SHIPPED | OUTPATIENT
Start: 2017-12-13 | End: 2017-12-13

## 2017-12-13 RX ORDER — MORPHINE SULFATE 30 MG/1
30 TABLET ORAL
Qty: 90 TAB | Refills: 0 | Status: SHIPPED | OUTPATIENT
Start: 2017-12-13 | End: 2017-12-13 | Stop reason: SDUPTHER

## 2017-12-13 NOTE — PROGRESS NOTES
Nursing Notes    Patient presents to the office today in follow-up. Patient rates her pain at 6/10 on the numerical pain scale. Reviewed medications with counts as follows:    Rx Date filled Qty Dispensed Pill Count Last Dose Short   Morphine sulfate 30mg IR 11/17/17 90 3 Yesterday  Yes ( two days short )                                             Comments: Patient noted to be short on medications ,morphine sulfate 30mg IR ( two days short ) this office visit; advised to follow dosing schedule as prescribed by [facility]. POC UDS was not performed in office today    Any new labs or imaging since last appointment? YES; labwork     Have you been to an emergency room (ER) or urgent care clinic since your last visit? YES; [facility]          Have you been hospitalized since your last visit? NO     If yes, where, when, and reason for visit? Have you seen or consulted any other health care providers outside of the [address] [place] [address]  since your last visit? YES     If yes, where, when, and reason for visit? Podiatry and urgent care physicians       HM deferred to pcp.

## 2017-12-13 NOTE — MR AVS SNAPSHOT
Visit Information Date & Time Provider Department Dept. Phone Encounter #  
 12/13/2017  4:30 PM Scarlett Lizarraga MD [address] [facility] [id number] Follow-up Instructions Return in about 7 weeks (around 1/31/2018). Upcoming Health Maintenance Date Due  
 FOOT EXAM Q1 3/3/1950 MICROALBUMIN Q1 3/3/1950 EYE EXAM RETINAL OR DILATED Q1 3/3/1948 DTaP/Tdap/Td series (1 - Tdap) 3/3/1959 ZOSTER VACCINE AGE 60> 1/3/1998 GLAUCOMA SCREENING Q2Y 3/3/2003 MEDICARE YEARLY EXAM 3/3/2003 Pneumococcal 65+ Low/Medium Risk (2 of 2 - PPSV23) 1/1/2016 LIPID PANEL Q1 9/8/2016 HEMOGLOBIN A1C Q6M 5/25/2017 Allergies as of 12/13/2017  Review Complete On: 12/13/2017 By: Scarlett Lizarraga MD  
  
 Severity Noted Reaction Type Reactions Adhesive  03/24/2016    Itching Augmentin [Amoxicillin-pot Clavulanate]  12/22/2012   Side Effect Nausea and Vomiting, Nausea Only Hydrocodone-acetaminophen  03/24/2016    Unknown (comments) Iodinated Contrast- Oral And Iv Dye  03/16/2016    Shortness of Breath \"MY BODY WENT AGAINST ME\" Nubain [Nalbuphine]    Unknown (comments), Shortness of Breath Labored breathing and chest tightness Current Immunizations  Reviewed on 1/19/2015 Name Date Pneumococcal Vaccine (Unspecified Type) 1/1/2011 Not reviewed this visit Vitals BP Pulse Temp Resp Weight(growth percentile) BMI  
 141/75 85 95.1 °F (35.1 °C) 22 184 lb (83.5 kg) 27.98 kg/m2 OB Status Smoking Status Postmenopausal Never Smoker Vitals History BMI and BSA Data Body Mass Index Body Surface Area  
 27.98 kg/m 2 2 m 2 Preferred Pharmacy Pharmacy Name Phone [facility], [address] [facility] [address] [phone] Your Updated Medication List  
  
   
 This list is accurate as of: 12/13/17  4:46 PM.  Always use your most recent med list.  
  
  
  
  
 amiodarone 200 mg tablet Commonly known as:  CORDARONE Take 1 tablet by mouth daily. amLODIPine 5 mg tablet Commonly known as:  Linda Spruce Take 5 mg by mouth daily. apixaban 5 mg tablet Commonly known as:  Clevester Shawl Take 1 tablet by mouth two (2) times a day. Indications: AF  
  
 atorvastatin 20 mg tablet Commonly known as:  LIPITOR Take 20 mg by mouth daily. COREG 25 mg tablet Generic drug:  carvedilol Take 25 mg by mouth two (2) times daily (with meals). escitalopram oxalate 10 mg tablet Commonly known as:  Colan Big Take 1 Tab by mouth daily. furosemide 20 mg tablet Commonly known as:  LASIX Take 3 tablet daily with breakfast  
  
 levothyroxine 25 mcg tablet Commonly known as:  SYNTHROID Take  by mouth Daily (before breakfast). lisinopril 20 mg tablet Commonly known as:  Marge Shelia Take 20 mg by mouth daily. * morphine IR 30 mg tablet Commonly known as:  MS IR Take 1 Tab by mouth four (4) times daily as needed for Pain. Max Daily Amount: 120 mg.  
  
 * morphine IR 30 mg tablet Commonly known as:  MS IR Take 1 Tab by mouth three (3) times daily as needed for Pain. Max Daily Amount: 90 mg.  
  
 naloxone 4 mg/actuation nasal spray Commonly known as:  NARCAN  
4 mg by Nasal route as needed. oxyCODONE-acetaminophen 5-325 mg per tablet Commonly known as:  PERCOCET Take 1 Tab by mouth every six (6) hours as needed for Pain. Max Daily Amount: 4 Tabs. potassium chloride 20 mEq tablet Commonly known as:  K-DUR, KLOR-CON Take 0.5 Tabs by mouth daily. spironolactone 25 mg tablet Commonly known as:  ALDACTONE Take 1 Tab by mouth daily. * Notice: This list has 2 medication(s) that are the same as other medications prescribed for you.  Read the directions carefully, and ask your doctor or other care provider to review them with you. Follow-up Instructions Return in about 7 weeks (around 1/31/2018). Introducing [facility]! Alondra Elias introduces [employer] patient portal. Now you can access parts of your medical record, email your doctor's office, and request medication refills online. 1. In your internet browser, go to [email or web address] 2. Click on the First Time User? Click Here link in the Sign In box. You will see the New Member Sign Up page. 3. Enter your [employer] Access Code exactly as it appears below. You will not need to use this code after youve completed the sign-up process. If you do not sign up before the expiration date, you must request a new code. · [employer] Access Code: [email or web address] Expires: 3/13/2018  4:30 PM 
 
4. Enter the last four digits of your Social Security Number (xxxx) and Date of Birth (mm/dd/yyyy) as indicated and click Submit. You will be taken to the next sign-up page. 5. Create a [employer] ID. This will be your [employer] login ID and cannot be changed, so think of one that is secure and easy to remember. 6. Create a [employer] password. You can change your password at any time. 7. Enter your Password Reset Question and Answer. This can be used at a later time if you forget your password. 8. Enter your e-mail address. You will receive e-mail notification when new information is available in [address]. 9. Click Sign Up. You can now view and download portions of your medical record. 10. Click the Download Summary menu link to download a portable copy of your medical information. If you have questions, please visit the Frequently Asked Questions section of the [employer] website. Remember, [employer] is NOT to be used for urgent needs. For medical emergencies, dial 911. Now available from your iPhone and Android! Please provide this summary of care documentation to your next provider. Your primary care clinician is listed as NONE. If you have any questions after today's visit, please call [phone].

## 2017-12-13 NOTE — PROGRESS NOTES
HISTORY OF PRESENT ILLNESS  Shena Randolph is a 76 y.o. female. HPI Comments: Visit survey reviewed  Chief complaint- chronic pain syndrome- polyarthralgia, neck pain  Medication helps general activity, mood, walking, sleep, enjoyment of life  She is accompanied by her daughter today who told me they are still looking for a rheumatologist.  The patient will continue with immediate release morphine, 30 mg, 3 times a day as needed  Nursing reports, pill count was 2 days short. I have requested a warning letter. No new significant side effects reported  Medication continues to help improve quality of life. Medications reviewed including risk and benefits. Recent average level of pain-6,7    Measurement of clinical outcome for chronic pain patient/ SPAASMS Score Card-            Information reviewed and will be scanned. Total score today-8      Review of Systems   Constitutional: Negative for malaise/fatigue and weight loss. HENT: Positive for tinnitus. Eyes: Negative for blurred vision and double vision. Respiratory: Negative for cough and sputum production. Cardiovascular: Negative for chest pain and palpitations. Gastrointestinal: Negative for heartburn and nausea. Genitourinary: Negative for dysuria and urgency. Musculoskeletal: Positive for joint pain and neck pain. Skin: Negative for itching and rash. Neurological: Negative for focal weakness, seizures and headaches. Endo/Heme/Allergies: Negative for environmental allergies. Does not bruise/bleed easily. Psychiatric/Behavioral: Positive for depression. Negative for suicidal ideas. The patient has insomnia. Physical Exam   Constitutional: She appears well-developed and well-nourished. She is cooperative. She does not have a sickly appearance. HENT:   Head: Normocephalic and atraumatic. Right Ear: External ear normal. No drainage. Left Ear: External ear normal. No drainage.    Nose: Nose normal.   Eyes: Lids are normal. Right eye exhibits no discharge. Left eye exhibits no discharge. Right conjunctiva has no hemorrhage. Left conjunctiva has no hemorrhage. Neck: Neck supple. No tracheal deviation present. No thyroid mass present. Pulmonary/Chest: Effort normal. No respiratory distress. Neurological: She is alert. No cranial nerve deficit. Skin: Skin is intact. No rash noted. Psychiatric: Her speech is normal. Her affect is not angry. She does not express inappropriate judgment. Nursing note and vitals reviewed. ASSESSMENT and PLAN  Encounter Diagnoses   Name Primary?  Pain in joint, multiple sites Yes    Psoriatic arthropathy (HCC)     Cervicalgia     Polyarthralgia     Chronic pain syndrome     Bilateral chronic knee pain     Neck pain, chronic     Generalized osteoarthritis     Degenerative disc disease, cervical     Osteoarthritis of cervical spine, unspecified spinal osteoarthritis complication status     History of anxiety     History of depression    No indicators for recent medication misuse.  reviewed. Pain Meds and Quality Of Life have been reviewed. Nonpharmacologic therapy and non-opioid pharmacologic therapy were considered. If opioid therapy is prescribed, this is only if the expected benefits are anticipated to outweigh risks. Possible changes to treatment plan considered. Support/education given as needed. Today-medications are as listed. No significant changes to medications. Follow up -- 2 months.

## 2018-01-23 ENCOUNTER — OFFICE VISIT (OUTPATIENT)
Dept: PAIN MANAGEMENT | Age: 78
End: 2018-01-23

## 2018-01-23 VITALS
WEIGHT: 181 LBS | DIASTOLIC BLOOD PRESSURE: 71 MMHG | BODY MASS INDEX: 27.43 KG/M2 | TEMPERATURE: 98.7 F | RESPIRATION RATE: 14 BRPM | SYSTOLIC BLOOD PRESSURE: 150 MMHG | HEART RATE: 82 BPM | HEIGHT: 68 IN

## 2018-01-23 DIAGNOSIS — G89.4 CHRONIC PAIN SYNDROME: ICD-10-CM

## 2018-01-23 DIAGNOSIS — G89.29 NECK PAIN, CHRONIC: ICD-10-CM

## 2018-01-23 DIAGNOSIS — M15.9 GENERALIZED OSTEOARTHRITIS: ICD-10-CM

## 2018-01-23 DIAGNOSIS — M50.30 DEGENERATIVE DISC DISEASE, CERVICAL: ICD-10-CM

## 2018-01-23 DIAGNOSIS — M47.812 OSTEOARTHRITIS OF CERVICAL SPINE, UNSPECIFIED SPINAL OSTEOARTHRITIS COMPLICATION STATUS: ICD-10-CM

## 2018-01-23 DIAGNOSIS — M25.561 BILATERAL CHRONIC KNEE PAIN: ICD-10-CM

## 2018-01-23 DIAGNOSIS — Z86.59 HISTORY OF DEPRESSION: ICD-10-CM

## 2018-01-23 DIAGNOSIS — G89.29 BILATERAL CHRONIC KNEE PAIN: ICD-10-CM

## 2018-01-23 DIAGNOSIS — M25.50 POLYARTHRALGIA: Primary | ICD-10-CM

## 2018-01-23 DIAGNOSIS — Z86.59 HISTORY OF ANXIETY: ICD-10-CM

## 2018-01-23 DIAGNOSIS — M25.562 BILATERAL CHRONIC KNEE PAIN: ICD-10-CM

## 2018-01-23 DIAGNOSIS — M54.2 NECK PAIN, CHRONIC: ICD-10-CM

## 2018-01-23 RX ORDER — SPIRONOLACTONE 25 MG/1
25 TABLET ORAL DAILY
Qty: 30 TAB | Refills: 0 | Status: SHIPPED | OUTPATIENT
Start: 2018-01-23

## 2018-01-23 RX ORDER — MORPHINE SULFATE 30 MG/1
30 TABLET ORAL
Qty: 90 TAB | Refills: 0 | Status: SHIPPED | OUTPATIENT
Start: 2018-03-21 | End: 2018-01-23 | Stop reason: SDUPTHER

## 2018-01-23 RX ORDER — MORPHINE SULFATE 30 MG/1
30 TABLET ORAL
Qty: 90 TAB | Refills: 0 | Status: SHIPPED | OUTPATIENT
Start: 2018-02-22 | End: 2018-04-25 | Stop reason: SDUPTHER

## 2018-01-23 RX ORDER — MORPHINE SULFATE 30 MG/1
30 TABLET ORAL
Qty: 90 TAB | Refills: 0 | Status: SHIPPED | OUTPATIENT
Start: 2018-01-23 | End: 2018-04-25 | Stop reason: SDUPTHER

## 2018-01-23 NOTE — MR AVS SNAPSHOT
[address] [facility] [address] 
[phone] Patient: Kenneth Edmondson MRN: [id number] Visit Information Date & Time Provider Department Dept. Phone Encounter #  
 1/23/2018  2:00 PM Jorge Sun MD [facility] [id number] Follow-up Instructions Return in about 3 months (around 4/23/2018). Follow-up and Disposition History Upcoming Health Maintenance Date Due  
 FOOT EXAM Q1 3/3/1950 MICROALBUMIN Q1 3[birth year]3/1950 EYE EXAM RETINAL OR DILATED Q1 3/3/1948 DTaP/Tdap/Td series (1 - Tdap) 3/3/1959 ZOSTER VACCINE AGE 60> 1/3/1998 GLAUCOMA SCREENING Q2Y 3/3/2003 MEDICARE YEARLY EXAM 3/3/2003 Pneumococcal 65+ Low/Medium Risk (2 of 2 - PPSV23) 1/1/2016 LIPID PANEL Q1 9/8/2016 HEMOGLOBIN A1C Q6M 5/25/2017 Allergies as of 1/23/2018  Review Complete On: 1/23/2018 By: Jorge Sun MD  
  
 Severity Noted Reaction Type Reactions Adhesive  03/24/2016    Itching Augmentin [Amoxicillin-pot Clavulanate]  12/22/2012   Side Effect Nausea and Vomiting, Nausea Only Hydrocodone-acetaminophen  03/24/2016    Unknown (comments) Iodinated Contrast- Oral And Iv Dye  03/16/2016    Shortness of Breath \"MY BODY WENT AGAINST ME\" Nubain [Nalbuphine]    Unknown (comments), Shortness of Breath Labored breathing and chest tightness Current Immunizations  Reviewed on 1/19/2015 Name Date Pneumococcal Vaccine (Unspecified Type) 1/1/2011 Not reviewed this visit You Were Diagnosed With   
  
 Codes Comments Polyarthralgia    -  Primary ICD-10-CM: M25.50 ICD-9-CM: 719.49 Chronic pain syndrome     ICD-10-CM: G89.4 ICD-9-CM: 338.4 Bilateral chronic knee pain     ICD-10-CM: M25.561, M25.562, G89.29 ICD-9-CM: 719.46, 338.29 Neck pain, chronic     ICD-10-CM: M54.2, G89.29 ICD-9-CM: 723.1, 338.29   
 Generalized osteoarthritis     ICD-10-CM: M15.9 ICD-9-CM: 715.00 Degenerative disc disease, cervical     ICD-10-CM: M50.30 ICD-9-CM: 722.4 Osteoarthritis of cervical spine, unspecified spinal osteoarthritis complication status     KTA-26-YA: D23.886 ICD-9-CM: 721.0 History of anxiety     ICD-10-CM: Z86.59 
ICD-9-CM: V11.8 History of depression     ICD-10-CM: Z86.59 
ICD-9-CM: V11.8 Vitals BP Pulse Temp Resp Height(growth percentile) Weight(growth percentile) 150/71 (BP 1 Location: Left arm, BP Patient Position: Sitting) 82 98.7 °F (37.1 °C) (Oral) 14 5' 8\" (1.727 m) 181 lb (82.1 kg) BMI OB Status Smoking Status 27.52 kg/m2 Postmenopausal Never Smoker Vitals History BMI and BSA Data Body Mass Index Body Surface Area  
 27.52 kg/m 2 1.98 m 2 Preferred Pharmacy Pharmacy Name Phone [facility], [address] [place] [address] [phone] Your Updated Medication List  
  
   
This list is accurate as of: 1/23/18  2:36 PM.  Always use your most recent med list.  
  
  
  
  
 amiodarone 200 mg tablet Commonly known as:  CORDARONE Take 1 tablet by mouth daily. amLODIPine 5 mg tablet Commonly known as:  Raine [place] Take 5 mg by mouth daily. apixaban 5 mg tablet Commonly known as:  Abril Bussing Take 1 tablet by mouth two (2) times a day. Indications: AF  
  
 atorvastatin 20 mg tablet Commonly known as:  LIPITOR Take 20 mg by mouth daily. COREG 25 mg tablet Generic drug:  carvedilol Take 25 mg by mouth two (2) times daily (with meals). escitalopram oxalate 10 mg tablet Commonly known as:  Anjali Yamileth Take 1 Tab by mouth daily. furosemide 20 mg tablet Commonly known as:  LASIX Take 3 tablet daily with breakfast  
  
 levothyroxine 25 mcg tablet Commonly known as:  SYNTHROID Take  by mouth Daily (before breakfast). lisinopril 20 mg tablet Commonly known as:  Metta Lawman Take 20 mg by mouth daily. * morphine IR 30 mg tablet Commonly known as:  MS IR Take 1 Tab by mouth four (4) times daily as needed for Pain. Max Daily Amount: 120 mg.  
  
 * morphine IR 30 mg tablet Commonly known as:  MS IR Take 1 Tab by mouth three (3) times daily as needed for Pain. Max Daily Amount: 90 mg.  
  
 * morphine IR 30 mg tablet Commonly known as:  MS IR Take 1 Tab by mouth three (3) times daily as needed for Pain. Max Daily Amount: 90 mg.  
  
 * morphine IR 30 mg tablet Commonly known as:  MS IR Take 1 Tab by mouth three (3) times daily as needed for Pain. Max Daily Amount: 90 mg. Start taking on:  2/22/2018  
  
 naloxone 4 mg/actuation nasal spray Commonly known as:  NARCAN  
4 mg by Nasal route as needed. oxyCODONE-acetaminophen 5-325 mg per tablet Commonly known as:  PERCOCET Take 1 Tab by mouth every six (6) hours as needed for Pain. Max Daily Amount: 4 Tabs. potassium chloride 20 mEq tablet Commonly known as:  K-DUR, KLOR-CON Take 0.5 Tabs by mouth daily. spironolactone 25 mg tablet Commonly known as:  ALDACTONE Take 1 Tab by mouth daily. * Notice: This list has 4 medication(s) that are the same as other medications prescribed for you. Read the directions carefully, and ask your doctor or other care provider to review them with you. Prescriptions Printed Refills  
 morphine IR (MS IR) 30 mg tablet 0 Starting on: 2/22/2018 Sig: Take 1 Tab by mouth three (3) times daily as needed for Pain. Max Daily Amount: 90 mg.  
 Class: Print Route: Oral  
 morphine IR (MS IR) 30 mg tablet 0 Sig: Take 1 Tab by mouth three (3) times daily as needed for Pain. Max Daily Amount: 90 mg.  
 Class: Print Route: Oral  
  
Prescriptions Sent to Pharmacy Refills  
 spironolactone (ALDACTONE) 25 mg tablet 0 Sig: Take 1 Tab by mouth daily. Class: Normal  
 Pharmacy: [facility] [address] [place] [address] Se, [address] #: [phone] Route: Oral  
  
Follow-up Instructions Return in about 3 months (around 4/23/2018). Introducing [facility]! [facility] introduces [employer] patient portal. Now you can access parts of your medical record, email your doctor's office, and request medication refills online. 1. In your internet browser, go to [email or web address] 2. Click on the First Time User? Click Here link in the Sign In box. You will see the New Member Sign Up page. 3. Enter your [employer] Access Code exactly as it appears below. You will not need to use this code after youve completed the sign-up process. If you do not sign up before the expiration date, you must request a new code. · [employer] Access Code: [email or web address] Expires: 3/13/2018  4:30 PM 
 
4. Enter the last four digits of your Social Security Number (xxxx) and Date of Birth (mm/dd/yyyy) as indicated and click Submit. You will be taken to the next sign-up page. 5. Create a [employer] ID. This will be your [employer] login ID and cannot be changed, so think of one that is secure and easy to remember. 6. Create a [employer] password. You can change your password at any time. 7. Enter your Password Reset Question and Answer. This can be used at a later time if you forget your password. 8. Enter your e-mail address. You will receive e-mail notification when new information is available in [address] 9. Click Sign Up. You can now view and download portions of your medical record. 10. Click the Download Summary menu link to download a portable copy of your medical information. If you have questions, please visit the Frequently Asked Questions section of the [employer] website. Remember, [employer] is NOT to be used for urgent needs. For medical emergencies, dial 911. Now available from your iPhone and Android! Please provide this summary of care documentation to your next provider. Your primary care clinician is listed as NONE. If you have any questions after today's visit, please call [phone].

## 2018-01-23 NOTE — PROGRESS NOTES
Nursing Notes    Patient presents to the office today in follow-up. Patient rates her pain at 8/10 on the numerical pain scale. Reviewed medications with counts as follows:    Rx Date filled Qty Dispensed Pill Count Last Dose Short   Morphine Sulf IR 30 mg tab 1/12/18 90 48 today 1 day short                                          Comments:   /71 today. Patient stated they've taken BP meds today and denies chest pain and dizziness. POC UDS was not performed in office today    Any new labs or imaging since last appointment? NO    Have you been to an emergency room (ER) or urgent care clinic since your last visit? NO            Have you been hospitalized since your last visit? NO     If yes, where, when, and reason for visit? Have you seen or consulted any other health care providers outside of the [facility]  since your last visit? NO   If yes, where, when, and reason for visit? HM deferred to pcp.

## 2018-01-23 NOTE — PROGRESS NOTES
HISTORY OF PRESENT ILLNESS  Yasmin Mar is a 76 y.o. female. HPI Comments: Visit survey reviewed  Chief complaint chronic pain syndrome polyarthralgia  She will continue with immediate release morphine 30 mg, 3 times a day as needed  -The visit survey indicates that medications help general activity, mood, walking, sleep, enjoyment of life. The patient will continue medications. No new significant side effects reported  Medication continues to help improve quality of life. Medications reviewed including risk and benefits. Recent average level of pain6    Measurement of clinical outcome for chronic pain patient/ SPAASMS Score Card-            Information reviewed and will be scanned. Total score today-10      Review of Systems   Constitutional: Negative for malaise/fatigue and weight loss. HENT: Positive for tinnitus. Eyes: Negative for blurred vision and double vision. Respiratory: Negative for cough and sputum production. Cardiovascular: Negative for chest pain and palpitations. Gastrointestinal: Negative for heartburn and nausea. Genitourinary: Negative for dysuria and urgency. Musculoskeletal: Positive for joint pain and neck pain. Skin: Negative for itching and rash. Neurological: Negative for focal weakness, seizures and headaches. Endo/Heme/Allergies: Negative for environmental allergies. Does not bruise/bleed easily. Psychiatric/Behavioral: Positive for depression. Negative for suicidal ideas. The patient has insomnia. Physical Exam   Constitutional: She appears well-developed and well-nourished. She is cooperative. She does not have a sickly appearance. HENT:   Head: Normocephalic and atraumatic. Right Ear: External ear normal. No drainage. Left Ear: External ear normal. No drainage. Nose: Nose normal.   Eyes: Lids are normal. Right eye exhibits no discharge. Left eye exhibits no discharge. Right conjunctiva has no hemorrhage.  Left conjunctiva has no hemorrhage. Neck: Neck supple. No tracheal deviation present. No thyroid mass present. Pulmonary/Chest: Effort normal. No respiratory distress. Neurological: She is alert. No cranial nerve deficit. Skin: Skin is intact. No rash noted. Psychiatric: Her speech is normal. Her affect is not angry. She does not express inappropriate judgment. Nursing note and vitals reviewed. ASSESSMENT and PLAN  Encounter Diagnoses   Name Primary?  Polyarthralgia Yes    Chronic pain syndrome     Bilateral chronic knee pain     Neck pain, chronic     Generalized osteoarthritis     Degenerative disc disease, cervical     Osteoarthritis of cervical spine, unspecified spinal osteoarthritis complication status     History of anxiety     History of depression    No indicators for recent medication misuse.  reviewed. Pain Meds and Quality Of Life have been reviewed. Nonpharmacologic therapy and non-opioid pharmacologic therapy were considered. If opioid therapy is prescribed, this is only if the expected benefits are anticipated to outweigh risks. Possible changes to treatment plan considered. Support/education given as needed. Today-medications are as listed. No significant changes to medications. Follow up -- 3 months.

## 2018-04-25 ENCOUNTER — OFFICE VISIT (OUTPATIENT)
Dept: PAIN MANAGEMENT | Age: 78
End: 2018-04-25

## 2018-04-25 VITALS
HEIGHT: 68 IN | HEART RATE: 78 BPM | SYSTOLIC BLOOD PRESSURE: 127 MMHG | DIASTOLIC BLOOD PRESSURE: 75 MMHG | RESPIRATION RATE: 18 BRPM | BODY MASS INDEX: 27.43 KG/M2 | WEIGHT: 181 LBS | TEMPERATURE: 98 F

## 2018-04-25 DIAGNOSIS — M25.512 PAIN IN JOINT OF LEFT SHOULDER: ICD-10-CM

## 2018-04-25 DIAGNOSIS — Z79.899 ENCOUNTER FOR LONG-TERM (CURRENT) USE OF HIGH-RISK MEDICATION: ICD-10-CM

## 2018-04-25 DIAGNOSIS — M47.812 OSTEOARTHRITIS OF CERVICAL SPINE, UNSPECIFIED SPINAL OSTEOARTHRITIS COMPLICATION STATUS: ICD-10-CM

## 2018-04-25 DIAGNOSIS — M50.30 DEGENERATIVE DISC DISEASE, CERVICAL: Primary | ICD-10-CM

## 2018-04-25 DIAGNOSIS — G89.4 CHRONIC PAIN SYNDROME: ICD-10-CM

## 2018-04-25 DIAGNOSIS — M25.561 BILATERAL CHRONIC KNEE PAIN: ICD-10-CM

## 2018-04-25 DIAGNOSIS — M25.50 POLYARTHRALGIA: ICD-10-CM

## 2018-04-25 DIAGNOSIS — M25.562 BILATERAL CHRONIC KNEE PAIN: ICD-10-CM

## 2018-04-25 DIAGNOSIS — G89.29 BILATERAL CHRONIC KNEE PAIN: ICD-10-CM

## 2018-04-25 DIAGNOSIS — M54.2 CERVICALGIA: ICD-10-CM

## 2018-04-25 DIAGNOSIS — M25.569 ARTHRALGIA OF LOWER LEG, UNSPECIFIED LATERALITY: ICD-10-CM

## 2018-04-25 LAB
ALCOHOL UR POC: NORMAL
AMPHETAMINES UR POC: NEGATIVE
BARBITURATES UR POC: NORMAL
BENZODIAZEPINES UR POC: NORMAL
BUPRENORPHINE UR POC: NEGATIVE
CANNABINOIDS UR POC: NEGATIVE
CARISOPRODOL UR POC: NORMAL
COCAINE UR POC: NEGATIVE
FENTANYL UR POC: NORMAL
MDMA/ECSTASY UR POC: NORMAL
METHADONE UR POC: NEGATIVE
METHAMPHETAMINE UR POC: NORMAL
METHYLPHENIDATE UR POC: NORMAL
OPIATES UR POC: NORMAL
OXYCODONE UR POC: NORMAL
PHENCYCLIDINE UR POC: NORMAL
PROPOXYPHENE UR POC: NORMAL
TRAMADOL UR POC: NORMAL
TRICYCLICS UR POC: NORMAL

## 2018-04-25 RX ORDER — MORPHINE SULFATE 30 MG/1
30 TABLET ORAL
Qty: 90 TAB | Refills: 0 | Status: SHIPPED | OUTPATIENT
Start: 2018-05-05 | End: 2018-05-29 | Stop reason: SDUPTHER

## 2018-04-25 RX ORDER — PROMETHAZINE HYDROCHLORIDE 12.5 MG/1
12.5 TABLET ORAL
COMMUNITY
Start: 2018-04-21 | End: 2021-10-06

## 2018-04-25 RX ORDER — GLIMEPIRIDE 2 MG/1
2 TABLET ORAL
Refills: 2 | COMMUNITY
Start: 2018-02-12 | End: 2021-10-06

## 2018-04-25 NOTE — MR AVS SNAPSHOT
[address] [facility] [address] 
[phone] Patient: Da Rater MRN: [id number] LUX:[id number] Visit Information Date & Time Provider Department Dept. Phone Encounter #  
 4/25/2018  2:00 PM Ana Galeanowill [facility] [phone] Upcoming Health Maintenance Date Due  
 FOOT EXAM Q1 3/3/1950 MICROALBUMIN Q1 3/3/1950 EYE EXAM RETINAL OR DILATED Q1 3/3/1948 DTaP/Tdap/Td series (1 - Tdap) 3/3/1959 ZOSTER VACCINE AGE 60> 1/3/1998 GLAUCOMA SCREENING Q2Y 3/3/2003 Pneumococcal 65+ Low/Medium Risk (2 of 2 - PPSV23) 1/1/2016 LIPID PANEL Q1 9/8/2016 HEMOGLOBIN A1C Q6M 5/25/2017 MEDICARE YEARLY EXAM 3/14/2018 Allergies as of 4/25/2018  Review Complete On: 4/25/2018 By: Sharona Lopes Severity Noted Reaction Type Reactions Adhesive  04/26/2010    Itching, Rash Augmentin [Amoxicillin-pot Clavulanate]  08/18/2008   Side Effect Nausea and Vomiting, Nausea Only Other reaction(s): gi distress Hydrocodone-acetaminophen  02/23/2015    Unknown (comments) Iodinated Contrast- Oral And Iv Dye  10/09/2015    Shortness of Breath, Other (comments) \"MY BODY WENT AGAINST ME\" \"MY BODY WENT AGAINST ME\" Nubain [Nalbuphine]  08/18/2008    Unknown (comments), Shortness of Breath Other reaction(s): wheezing/sob Labored breathing and chest tightness Current Immunizations  Reviewed on 1/19/2015 Name Date Pneumococcal Vaccine (Unspecified Type) 1/1/2011 Not reviewed this visit You Were Diagnosed With   
  
 Codes Comments Degenerative disc disease, cervical    -  Primary ICD-10-CM: M50.30 ICD-9-CM: 722.4 Polyarthralgia     ICD-10-CM: M25.50 ICD-9-CM: 719.49 Pain in joint of left shoulder     ICD-10-CM: M25.512 ICD-9-CM: 719.41 Arthralgia of lower leg, unspecified laterality     ICD-10-CM: M25.569 ICD-9-CM: 719.46   
 Osteoarthritis of cervical spine, unspecified spinal osteoarthritis complication status     OUA-71-FU: F63.823 ICD-9-CM: 721.0 Chronic pain syndrome     ICD-10-CM: G89.4 ICD-9-CM: 338.4 Cervicalgia     ICD-10-CM: M54.2 ICD-9-CM: 723.1 Bilateral chronic knee pain     ICD-10-CM: M25.561, M25.562, G89.29 ICD-9-CM: 719.46, 338.29 Encounter for long-term (current) use of high-risk medication     ICD-10-CM: Z79.899 ICD-9-CM: V58.69 Vitals BP Pulse Temp Resp Height(growth percentile) Weight(growth percentile) 127/75 (BP 1 Location: Right arm, BP Patient Position: Sitting) 78 98 °F (36.7 °C) (Oral) 18 5' 8\" (1.727 m) 181 lb (82.1 kg) BMI OB Status Smoking Status 27.52 kg/m2 Postmenopausal Never Smoker Vitals History BMI and BSA Data Body Mass Index Body Surface Area  
 27.52 kg/m 2 1.98 m 2 Preferred Pharmacy Pharmacy Name Phone [facility] [address] [phone] Your Updated Medication List  
  
   
This list is accurate as of 4/25/18  3:09 PM.  Always use your most recent med list.  
  
  
  
  
 amiodarone 200 mg tablet Commonly known as:  CORDARONE Take 1 tablet by mouth daily. amLODIPine 5 mg tablet Commonly known as:  [facility] Take 5 mg by mouth daily. apixaban 5 mg tablet Commonly known as:  Verlee Infield Take 1 tablet by mouth two (2) times a day. Indications: AF  
  
 atorvastatin 20 mg tablet Commonly known as:  LIPITOR Take 20 mg by mouth daily. COREG 25 mg tablet Generic drug:  carvedilol Take 25 mg by mouth two (2) times daily (with meals). escitalopram oxalate 10 mg tablet Commonly known as:  Mague Narrow Take 1 Tab by mouth daily. furosemide 20 mg tablet Commonly known as:  LASIX Take 3 tablet daily with breakfast  
  
 glimepiride 2 mg tablet Commonly known as:  AMARYL Take 2 mg by mouth every morning. levothyroxine 25 mcg tablet Commonly known as:  SYNTHROID Take  by mouth Daily (before breakfast). lisinopril 20 mg tablet Commonly known as:  Valene Pencil Take 20 mg by mouth daily. * morphine IR 30 mg tablet Commonly known as:  MS IR Take 1 Tab by mouth four (4) times daily as needed for Pain. Max Daily Amount: 120 mg.  
  
 * morphine IR 30 mg tablet Commonly known as:  MS IR Take 1 Tab by mouth three (3) times daily as needed for Pain. Max Daily Amount: 90 mg. Start taking on:  5/5/2018  
  
 naloxone 4 mg/actuation nasal spray Commonly known as:  NARCAN  
4 mg by Nasal route as needed. oxyCODONE-acetaminophen 5-325 mg per tablet Commonly known as:  PERCOCET Take 1 Tab by mouth every six (6) hours as needed for Pain. Max Daily Amount: 4 Tabs. potassium chloride 20 mEq tablet Commonly known as:  K-DUR, KLOR-CON Take 0.5 Tabs by mouth daily. promethazine 12.5 mg tablet Commonly known as:  PHENERGAN Take 12.5 mg by mouth every four (4) hours as needed. spironolactone 25 mg tablet Commonly known as:  ALDACTONE Take 1 Tab by mouth daily. * Notice: This list has 2 medication(s) that are the same as other medications prescribed for you. Read the directions carefully, and ask your doctor or other care provider to review them with you. Prescriptions Printed Refills  
 morphine IR (MS IR) 30 mg tablet 0 Starting on: 5/5/2018 Sig: Take 1 Tab by mouth three (3) times daily as needed for Pain. Max Daily Amount: 90 mg.  
 Class: Print Route: Oral  
  
We Performed the Following AMB POC DRUG SCREEN () [ [facility]] DRUG SCREEN [[id number] Custom] Introducing [facility]! Corinne Montiel introduces [employer] patient portal. Now you can access parts of your medical record, email your doctor's office, and request medication refills online. 1. In your internet browser, go to [email or web address] 2. Click on the First Time User? Click Here link in the Sign In box. You will see the New Member Sign Up page. 3. Enter your [employer] Access Code exactly as it appears below. You will not need to use this code after youve completed the sign-up process. If you do not sign up before the expiration date, you must request a new code. · [employer] Access Code: [email or web address] Expires: 7/24/2018  3:09 PM 
 
4. Enter the last four digits of your Social Security Number (xxxx) and Date of Birth (mm/dd/yyyy) as indicated and click Submit. You will be taken to the next sign-up page. 5. Create a [employer] ID. This will be your Premise login ID and cannot be changed, so think of one that is secure and easy to remember. 6. Create a [employer] password. You can change your password at any time. 7. Enter your Password Reset Question and Answer. This can be used at a later time if you forget your password. 8. Enter your e-mail address. You will receive e-mail notification when new information is available in [address] 9. Click Sign Up. You can now view and download portions of your medical record. 10. Click the Download Summary menu link to download a portable copy of your medical information. If you have questions, please visit the Frequently Asked Questions section of the [employer] website. Remember, [employer] is NOT to be used for urgent needs. For medical emergencies, dial 911. Now available from your iPhone and Android! Please provide this summary of care documentation to your next provider. Your primary care clinician is listed as NONE. If you have any questions after today's visit, please call [phone].

## 2018-04-25 NOTE — PROGRESS NOTES
Nursing Notes    Patient presents to the office today in follow-up. Patient rates her pain at 6/10 on the numerical pain scale. Reviewed medications with counts as follows:    Rx Date filled Qty Dispensed Pill Count Last Dose Short   Morphine 30 mg 04/06/18 90 17 This a.m Yes 13 pills                                      Patient noted to be short on medications Morphine 30 mg 13 pills short this office visit; advised to follow dosing schedule as prescribed by [facility]. Patient do not know why she is short. Patient understand that she suppose to take her medication as prescribed. POC UDS was performed in office today    Any new labs or imaging since last appointment? NO    Have you been to an emergency room (ER) or urgent care clinic since your last visit? NO            Have you been hospitalized since your last visit? NO     If yes, where, when, and reason for visit? Have you seen or consulted any other health care providers outside of the [address] [place] [address]  since your last visit? NO     If yes, where, when, and reason for visit? Ms. Bren Sebastian has a reminder for a \"due or due soon\" health maintenance. I have asked that she contact her primary care provider for follow-up on this health maintenance.   COMM was done and the score is 29/68

## 2018-04-25 NOTE — PROGRESS NOTES
HISTORY OF PRESENT ILLNESS  Willi Moody is a [de-identified] y.o. female    Ms. Sophia Mc returns today for f/u of chronic multiple joint pain. This is my first visit with this patient today. The patient denies any significant changes since last f/u. She has a PCP appointment this Friday. We discussed her current condition and medications in detail today. She tolerates medications without side effects. Willi Moody reports no change in sleep or constipation. Patient understands current practice transition and taper plan in future. Patient is planning on transferring her continued pain management care to another practice. She will sign a medical release form today. I will provide 1 month transition prescription. We will assist patient with any medical records transfer as needed. Current MME dose as of today:90    Current medication management consists of:immediate release morphine 30 mg, 3 times a day as needed  Medications are helping with pain control and quality of life. Her pain is 2-3/10 with medication and 10/10 without. Pt describes pain as aching, stabbing, and burning. Aggravating factors include standing, sitting, and walking. Relieved with rest, medication, and avoiding painful activities. The patient reports 30% relief with current medications. Current treatment is helping to improve general activity, mood, walking, sleep, enjoyment of life     Pain Meds and Quality Of Life have been reviewed. Nonpharmacologic therapy and non-opioid pharmacologic therapy were considered. If opioid therapy is prescribed, this is only if the expected benefits are anticipated to outweigh risks. She  is otherwise doing well with no other complaints today. She denies any adverse events including nausea, vomiting, dizziness, increased constipation, hallucinations, or seizures. The patient reports functional improvement and QOL with pain medication.        Vitals:    04/25/18 1447   BP: 127/75   Pulse: 78   Resp: 18 Temp: 98 °F (36.7 °C)   TempSrc: Oral   Weight: 82.1 kg (181 lb)   Height: 5' 8\" (1.727 m)   PainSc:   6   PainLoc: Neck         Allergies   Allergen Reactions    Adhesive Itching and Rash    Augmentin [Amoxicillin-Pot Clavulanate] Nausea and Vomiting and Nausea Only     Other reaction(s): gi distress    Hydrocodone-Acetaminophen Unknown (comments)    Iodinated Contrast- Oral And Iv Dye Shortness of Breath and Other (comments)     \"MY BODY WENT AGAINST ME\"  \"MY BODY WENT AGAINST ME\"    Nubain [Nalbuphine] Unknown (comments) and Shortness of Breath     Other reaction(s): wheezing/sob  Labored breathing and chest tightness       Past Surgical History:   Procedure Laterality Date    CARDIAC SURG PROCEDURE UNLIST      1/11  LIMA - LAD,  SVG - D1 / OM1 / RPLV    HX APPENDECTOMY      HX CHOLECYSTECTOMY      HX CORONARY ARTERY BYPASS GRAFT      HX HYSTERECTOMY      HX ORTHOPAEDIC      right shoulder repair    HX PACEMAKER         ROS   Constitutional: Negative for malaise/fatigue and weight loss. HENT: Positive for tinnitus. Eyes: Negative for blurred vision and double vision. Respiratory: Negative for cough and sputum production. Cardiovascular: Negative for chest pain and palpitations. Gastrointestinal: Negative for heartburn and nausea. Genitourinary: Negative for dysuria and urgency. Musculoskeletal: Positive for joint pain and neck pain. Skin: Negative for itching and rash. Neurological: Negative for focal weakness, seizures and headaches. Endo/Heme/Allergies: Negative for environmental allergies. Does not bruise/bleed easily. Psychiatric/Behavioral: Positive for depression. Negative for suicidal ideas. The patient has insomnia    Physical Exam   Constitutional: She appears well-developed and well-nourished. She is cooperative. She does not have a sickly appearance. HENT:   Head: Normocephalic and atraumatic. Right Ear: External ear normal. No drainage.    Left Ear: External ear normal. No drainage. Nose: Nose normal.   Eyes: Lids are normal. Right eye exhibits no discharge. Left eye exhibits no discharge. Right conjunctiva has no hemorrhage. Left conjunctiva has no hemorrhage. Neck: Neck supple. No tracheal deviation present. No thyroid mass present. Pulmonary/Chest: Effort normal. No respiratory distress. Neurological: She is alert. No cranial nerve deficit. Skin: Skin is intact. No rash noted. Psychiatric: Her speech is normal. Her affect is not angry. She does not express inappropriate judgment. Nursing note and vitals reviewed. ASSESSMENT:    1. Degenerative disc disease, cervical    2. Polyarthralgia    3. Pain in joint of left shoulder    4. Arthralgia of lower leg, unspecified laterality    5. Osteoarthritis of cervical spine, unspecified spinal osteoarthritis complication status    6. Chronic pain syndrome    7. Cervicalgia    8. Bilateral chronic knee pain    9. Encounter for long-term (current) use of high-risk medication          COMM:   Hayley John 1937 Program was reviewed which does not demonstrate aberrancies and/or inconsistencies with regard to the historical prescribing of controlled medications to this patient by other providers. Medications were brought to visit today. Pill count was appropriate. When possible, non-drug therapy for chronic pain should be used as a first-line treatment. Physical therapy exercise regimens, chiropractic manipulation, meditation relaxation techniques, cognitive behavior therapy, acupuncture, yoga, Navin Chi,  transcutaneous electrical nerve stimulation (TENS), and application of moist heat can help alleviate pain .      Explained that realistic expectations and goals with chronic pain management are to maximize function and minimize pain with the understanding that limitations will exist both in the extent of relief that she may achieve, as well as thresholds of mg strengths that we will not exceed. Our role is to help the patient better cope with chronic pain utilizng a multimodal approach. The patients condition and plan were discussed. All questions were answered. The patient agrees with the plan. PLAN / Pt Instructions:  1. Continue current plan with no evidence of addiction or diversion. 2. Stable on current medication without adverse events. 3. Refill morphine IR 30 mg tablet, take 1 tablet 3 times daily as needed  4. Discussed risks of addiction, dependency, and opioid induced hyperalgesia. 5. Reviewed with patient benefits of home exercise, and lifestyle changes to assist the patient in self-management of symptoms. 6. Patient plans on moving her pain management care to another provider. 7. She will tentatively schedule for 3 month follow-up. We will assist with medical records transfer as needed. Prescription monitoring program reviewed. POC UDS today. Pain medications prescribed with the objective of pain relief and improved physical and psychosocial function in this patient. DISPOSITION  · Counseled patient on proper use of prescribed medications. · Counseled patient about chronic medical conditions and their relationship to anxiety and depression and recommended mental health support as needed. · Reviewed with patient self-help tools, home exercise, and lifestyle changes to assist the patient in self-management of symptoms. · Reviewed with patient the treatment plan, goals of treatment plan, and limitations of treatment plan, to include the potential for side effects from medications and procedures. If side effects occur, it is the responsibility of the patient to inform the clinic so that a change in the treatment plan can be made in a safe manner. The patient is advised that stopping prescribed medication may cause an increase in symptoms and possible medication withdrawal symptoms.  The patient is informed an emergency room evaluation may be necessary if this occurs. Spent 25 minutes with patient today reviewing the treatment plan, goals of treatment plan, and limitations of the treatment plan, to include the potential for side effects from medications and procedures. More than 50% of the visit time was spent counseling the patient. Urbano Price PA-C 4/25/2018        Note: Please excuse any typographical errors. Voice recognition software was used for this note and may cause mistakes.

## 2018-05-29 ENCOUNTER — TELEPHONE (OUTPATIENT)
Dept: PAIN MANAGEMENT | Age: 78
End: 2018-05-29

## 2018-05-29 DIAGNOSIS — G89.4 CHRONIC PAIN SYNDROME: ICD-10-CM

## 2018-05-29 DIAGNOSIS — M25.561 BILATERAL CHRONIC KNEE PAIN: ICD-10-CM

## 2018-05-29 DIAGNOSIS — M47.812 OSTEOARTHRITIS OF CERVICAL SPINE, UNSPECIFIED SPINAL OSTEOARTHRITIS COMPLICATION STATUS: ICD-10-CM

## 2018-05-29 DIAGNOSIS — M25.569 ARTHRALGIA OF LOWER LEG, UNSPECIFIED LATERALITY: ICD-10-CM

## 2018-05-29 DIAGNOSIS — M25.512 PAIN IN JOINT OF LEFT SHOULDER: ICD-10-CM

## 2018-05-29 DIAGNOSIS — M25.562 BILATERAL CHRONIC KNEE PAIN: ICD-10-CM

## 2018-05-29 DIAGNOSIS — M54.2 CERVICALGIA: ICD-10-CM

## 2018-05-29 DIAGNOSIS — G89.29 BILATERAL CHRONIC KNEE PAIN: ICD-10-CM

## 2018-05-29 DIAGNOSIS — M25.50 POLYARTHRALGIA: ICD-10-CM

## 2018-05-29 NOTE — TELEPHONE ENCOUNTER
[facility] has called requesting a refill of their controlled medication,Morphine Sulfate IR 30 mg tab, for the management of chronic multiple joint pain. Last office visit date: 4/25/18. Date last  was pulled and reviewed : 5/29/18. Was the patient compliant when the above report was pulled? Yes. Analgesia: Patient states current pain medication regimen is improving pain by 40%. Aberrancies: Patient noted to be short on medication last visit on 4/25/18. (See FYI). ADL's: Patient stated they are able to perform ADL's while on current pain medication regimen. Adverse Reaction: Patient reports some constipation but uses OTC laxatives to control it. Provider's last note and plan of care reviewed? Yes. I did not see tapering plan in previous office note. Request forwarded to provider for review.

## 2018-05-30 RX ORDER — MORPHINE SULFATE 30 MG/1
30 TABLET ORAL
Qty: 90 TAB | Refills: 0 | Status: SHIPPED | OUTPATIENT
Start: 2018-06-04 | End: 2021-10-06

## 2018-05-31 ENCOUNTER — TELEPHONE (OUTPATIENT)
Dept: PAIN MANAGEMENT | Age: 78
End: 2018-05-31

## 2020-12-15 ENCOUNTER — APPOINTMENT (OUTPATIENT)
Dept: CT IMAGING | Age: 80
End: 2020-12-15
Attending: EMERGENCY MEDICINE
Payer: MEDICARE

## 2020-12-15 ENCOUNTER — HOSPITAL ENCOUNTER (EMERGENCY)
Age: 80
Discharge: HOME | End: 2020-12-15
Attending: EMERGENCY MEDICINE | Admitting: EMERGENCY MEDICINE
Payer: MEDICARE

## 2020-12-15 VITALS
HEART RATE: 80 BPM | TEMPERATURE: 98.5 F | OXYGEN SATURATION: 100 % | WEIGHT: 170 LBS | DIASTOLIC BLOOD PRESSURE: 70 MMHG | RESPIRATION RATE: 18 BRPM | SYSTOLIC BLOOD PRESSURE: 138 MMHG | HEIGHT: 68 IN | BODY MASS INDEX: 25.76 KG/M2

## 2020-12-15 DIAGNOSIS — R10.84 ABDOMINAL PAIN, GENERALIZED: Primary | ICD-10-CM

## 2020-12-15 LAB
ALBUMIN SERPL-MCNC: 3.2 G/DL (ref 3.4–5)
ALBUMIN/GLOB SERPL: 0.9 {RATIO} (ref 0.8–1.7)
ALP SERPL-CCNC: 116 U/L (ref 45–117)
ALT SERPL-CCNC: 23 U/L (ref 13–56)
ANION GAP SERPL CALC-SCNC: 5 MMOL/L (ref 3–18)
APPEARANCE UR: CLEAR
AST SERPL-CCNC: 13 U/L (ref 10–38)
BACTERIA URNS QL MICRO: NEGATIVE /HPF
BASOPHILS # BLD: 0 K/UL (ref 0–0.1)
BASOPHILS NFR BLD: 0 % (ref 0–2)
BILIRUB SERPL-MCNC: 0.6 MG/DL (ref 0.2–1)
BILIRUB UR QL: NEGATIVE
BUN SERPL-MCNC: 20 MG/DL (ref 7–18)
BUN/CREAT SERPL: 14 (ref 12–20)
CALCIUM SERPL-MCNC: 9.7 MG/DL (ref 8.5–10.1)
CHLORIDE SERPL-SCNC: 106 MMOL/L (ref 100–111)
CO2 SERPL-SCNC: 29 MMOL/L (ref 21–32)
COLOR UR: YELLOW
CREAT SERPL-MCNC: 1.45 MG/DL (ref 0.6–1.3)
DIFFERENTIAL METHOD BLD: ABNORMAL
EOSINOPHIL # BLD: 0.1 K/UL (ref 0–0.4)
EOSINOPHIL NFR BLD: 1 % (ref 0–5)
EPITH CASTS URNS QL MICRO: NORMAL /LPF (ref 0–5)
ERYTHROCYTE [DISTWIDTH] IN BLOOD BY AUTOMATED COUNT: 14.6 % (ref 11.6–14.5)
GLOBULIN SER CALC-MCNC: 3.6 G/DL (ref 2–4)
GLUCOSE SERPL-MCNC: 162 MG/DL (ref 74–99)
GLUCOSE UR STRIP.AUTO-MCNC: NEGATIVE MG/DL
HCT VFR BLD AUTO: 42.1 % (ref 35–45)
HGB BLD-MCNC: 13 G/DL (ref 12–16)
HGB UR QL STRIP: ABNORMAL
KETONES UR QL STRIP.AUTO: NEGATIVE MG/DL
LEUKOCYTE ESTERASE UR QL STRIP.AUTO: NEGATIVE
LYMPHOCYTES # BLD: 1.7 K/UL (ref 0.9–3.6)
LYMPHOCYTES NFR BLD: 27 % (ref 21–52)
MCH RBC QN AUTO: 30.6 PG (ref 24–34)
MCHC RBC AUTO-ENTMCNC: 30.9 G/DL (ref 31–37)
MCV RBC AUTO: 99.1 FL (ref 74–97)
MONOCYTES # BLD: 0.8 K/UL (ref 0.05–1.2)
MONOCYTES NFR BLD: 13 % (ref 3–10)
NEUTS SEG # BLD: 3.9 K/UL (ref 1.8–8)
NEUTS SEG NFR BLD: 59 % (ref 40–73)
NITRITE UR QL STRIP.AUTO: NEGATIVE
PH UR STRIP: 7.5 [PH] (ref 5–8)
PLATELET # BLD AUTO: 209 K/UL (ref 135–420)
PMV BLD AUTO: 10.2 FL (ref 9.2–11.8)
POTASSIUM SERPL-SCNC: 4.4 MMOL/L (ref 3.5–5.5)
PROT SERPL-MCNC: 6.8 G/DL (ref 6.4–8.2)
PROT UR STRIP-MCNC: NEGATIVE MG/DL
RBC # BLD AUTO: 4.25 M/UL (ref 4.2–5.3)
RBC #/AREA URNS HPF: NORMAL /HPF (ref 0–5)
SODIUM SERPL-SCNC: 140 MMOL/L (ref 136–145)
SP GR UR REFRACTOMETRY: 1.01 (ref 1–1.03)
UROBILINOGEN UR QL STRIP.AUTO: 1 EU/DL (ref 0.2–1)
WBC # BLD AUTO: 6.5 K/UL (ref 4.6–13.2)
WBC URNS QL MICRO: NORMAL /HPF (ref 0–4)

## 2020-12-15 PROCEDURE — 74176 CT ABD & PELVIS W/O CONTRAST: CPT

## 2020-12-15 PROCEDURE — 85025 COMPLETE CBC W/AUTO DIFF WBC: CPT

## 2020-12-15 PROCEDURE — 74011250637 HC RX REV CODE- 250/637: Performed by: EMERGENCY MEDICINE

## 2020-12-15 PROCEDURE — 81001 URINALYSIS AUTO W/SCOPE: CPT

## 2020-12-15 PROCEDURE — 99284 EMERGENCY DEPT VISIT MOD MDM: CPT

## 2020-12-15 PROCEDURE — 80053 COMPREHEN METABOLIC PANEL: CPT

## 2020-12-15 RX ORDER — ACETAMINOPHEN 500 MG
1000 TABLET ORAL ONCE
Status: COMPLETED | OUTPATIENT
Start: 2020-12-15 | End: 2020-12-15

## 2020-12-15 RX ADMIN — ACETAMINOPHEN 1000 MG: 500 TABLET, FILM COATED ORAL at 04:22

## 2020-12-15 NOTE — ED PROVIDER NOTES
EMERGENCY DEPARTMENT HISTORY AND PHYSICAL EXAM    3:33 AM      Date: 12/15/2020  Patient Name: Ludwig Rodríguez    History of Presenting Illness     Chief Complaint   Patient presents with    Abdominal Pain         History Provided By: patient    Additional History (Context): Ludwig Rodríguez is a 78 y.o. female presents with states she has had lower abdominal pain for many months worse over the last 2 weeks. Normal bowel movements no urinary or vaginal symptoms. States she has had hysterectomy and appendectomy. No fever or back pain. Reviewing record she has had a very similar visit through the [facility] system 9 months ago. Gabriella Canela PCP: None    Chief Complaint:   Duration:    Timing:    Location:   Quality:   Severity:   Modifying Factors:   Associated Symptoms:       Current Outpatient Medications   Medication Sig Dispense Refill    morphine IR (MS IR) 30 mg tablet Take 1 Tab by mouth three (3) times daily as needed for Pain. Max Daily Amount: 90 mg. 90 Tab 0    glimepiride (AMARYL) 2 mg tablet Take 2 mg by mouth every morning. 2    promethazine (PHENERGAN) 12.5 mg tablet Take 12.5 mg by mouth every four (4) hours as needed.  spironolactone (ALDACTONE) 25 mg tablet Take 1 Tab by mouth daily. 30 Tab 0    amLODIPine (NORVASC) 5 mg tablet Take 5 mg by mouth daily.  naloxone 4 mg/actuation spry 4 mg by Nasal route as needed. 1 Bottle 1    furosemide (LASIX) 20 mg tablet Take 3 tablet daily with breakfast 90 Tab 0    morphine IR (MS IR) 30 mg tablet Take 1 Tab by mouth four (4) times daily as needed for Pain. Max Daily Amount: 120 mg. 30 Tab 0    oxyCODONE-acetaminophen (PERCOCET) 5-325 mg per tablet Take 1 Tab by mouth every six (6) hours as needed for Pain. Max Daily Amount: 4 Tabs. 30 Tab 0    potassium chloride (K-DUR, KLOR-CON) 20 mEq tablet Take 0.5 Tabs by mouth daily. 30 Tab 0    levothyroxine (SYNTHROID) 25 mcg tablet Take  by mouth Daily (before breakfast).       atorvastatin (LIPITOR) 20 mg tablet Take 20 mg by mouth daily.  lisinopril (PRINIVIL, ZESTRIL) 20 mg tablet Take 20 mg by mouth daily.  carvedilol (COREG) 25 mg tablet Take 25 mg by mouth two (2) times daily (with meals).  escitalopram oxalate (LEXAPRO) 10 mg tablet Take 1 Tab by mouth daily. 90 Tab 3    amiodarone (CORDARONE) 200 mg tablet Take 1 tablet by mouth daily. 30 tablet 2    apixaban (ELIQUIS) 5 mg tablet Take 1 tablet by mouth two (2) times a day. Indications: AF 60 tablet 2       Past History     Past Medical History:  Past Medical History:   Diagnosis Date    AICD     9/12   [place] [facility]    Anxiety     nos    Atrial fibrillation     CABG     1/11  LIMA - LAD,  SVG - D1 / OM1 / RPLV    Cardiomyopathy     EF 30-35% (ECHO 9/10),  55% (ECHO 4/14),  50% (ECHO 1/15)    Cervicalgia     Coronary artery disease     Depression     nos    Diabetes     NIDDM    Dyslipidemia     Heart failure (HCC)     Hypertension     Pain management     Psoriasis     Psoriatic arthritis     Skin cancer        Past Surgical History:  Past Surgical History:   Procedure Laterality Date    CARDIAC SURG PROCEDURE UNLIST      1/11  LIMA - LAD,  SVG - D1 / OM1 / RPLV    HX APPENDECTOMY      HX CHOLECYSTECTOMY      HX CORONARY ARTERY BYPASS GRAFT      HX HYSTERECTOMY      HX ORTHOPAEDIC      right shoulder repair    HX PACEMAKER         Family History:  Family History   Problem Relation Age of Onset    Diabetes Other     Headache Other        Social History:  Social History     Tobacco Use    Smoking status: Never Smoker    Smokeless tobacco: Never Used   Substance Use Topics    Alcohol use: No     Alcohol/week: 0.0 standard drinks    Drug use: No       Allergies:   Allergies   Allergen Reactions    Adhesive Itching and Rash    Augmentin [Amoxicillin-Pot Clavulanate] Nausea and Vomiting and Nausea Only     Other reaction(s): gi distress    Hydrocodone-Acetaminophen Unknown (comments)    Iodinated Contrast Media Shortness of Breath and Other (comments)     \"MY BODY WENT AGAINST ME\"  \"MY BODY WENT AGAINST ME\"    Nubain [Nalbuphine] Unknown (comments) and Shortness of Breath     Other reaction(s): wheezing/sob  Labored breathing and chest tightness         Review of Systems     Review of Systems   Constitutional: Negative for diaphoresis and fever. HENT: Negative for congestion and sore throat. Eyes: Negative for pain and itching. Respiratory: Negative for cough and shortness of breath. Cardiovascular: Negative for chest pain and palpitations. Gastrointestinal: Positive for abdominal pain. Negative for diarrhea. Endocrine: Negative for polydipsia and polyuria. Genitourinary: Negative for dysuria and hematuria. Musculoskeletal: Negative for arthralgias and myalgias. Skin: Negative for rash and wound. Neurological: Negative for seizures and syncope. Hematological: Does not bruise/bleed easily. Psychiatric/Behavioral: Negative for agitation and hallucinations. Physical Exam       Patient Vitals for the past 12 hrs:   Temp Pulse Resp BP SpO2   12/15/20 0324 98.5 °F (36.9 °C) 80 18 138/70 100 %       Physical Exam  Vitals signs and nursing note reviewed. Constitutional:       General: She is not in acute distress. Appearance: She is well-developed. She is not ill-appearing. HENT:      Head: Normocephalic and atraumatic. Eyes:      General: No scleral icterus. Conjunctiva/sclera: Conjunctivae normal.   Neck:      Musculoskeletal: Normal range of motion and neck supple. Vascular: No JVD. Cardiovascular:      Rate and Rhythm: Normal rate and regular rhythm. Heart sounds: Normal heart sounds. Comments: 4 intact extremity pulses  Pulmonary:      Effort: Pulmonary effort is normal.      Breath sounds: Normal breath sounds. Abdominal:      Palpations: Abdomen is soft. There is no mass. Tenderness: There is no abdominal tenderness.  There is no right CVA tenderness or left CVA tenderness. Hernia: No hernia is present. Musculoskeletal: Normal range of motion. Lymphadenopathy:      Cervical: No cervical adenopathy. Skin:     General: Skin is warm and dry. Neurological:      Mental Status: She is alert. Diagnostic Study Results   Labs -  Recent Results (from the past 12 hour(s))   CBC WITH AUTOMATED DIFF    Collection Time: 12/15/20  3:45 AM   Result Value Ref Range    WBC 6.5 4.6 - 13.2 K/uL    RBC 4.25 4.20 - 5.30 M/uL    HGB 13.0 12.0 - 16.0 g/dL    HCT 42.1 35.0 - 45.0 %    MCV 99.1 (H) 74.0 - 97.0 FL    MCH 30.6 24.0 - 34.0 PG    MCHC 30.9 (L) 31.0 - 37.0 g/dL    RDW 14.6 (H) 11.6 - 14.5 %    PLATELET 414 135 - 758 K/uL    MPV 10.2 9.2 - 11.8 FL    NEUTROPHILS 59 40 - 73 %    LYMPHOCYTES 27 21 - 52 %    MONOCYTES 13 (H) 3 - 10 %    EOSINOPHILS 1 0 - 5 %    BASOPHILS 0 0 - 2 %    ABS. NEUTROPHILS 3.9 1.8 - 8.0 K/UL    ABS. LYMPHOCYTES 1.7 0.9 - 3.6 K/UL    ABS. MONOCYTES 0.8 0.05 - 1.2 K/UL    ABS. EOSINOPHILS 0.1 0.0 - 0.4 K/UL    ABS. BASOPHILS 0.0 0.0 - 0.1 K/UL    DF AUTOMATED     METABOLIC PANEL, COMPREHENSIVE    Collection Time: 12/15/20  3:45 AM   Result Value Ref Range    Sodium 140 136 - 145 mmol/L    Potassium 4.4 3.5 - 5.5 mmol/L    Chloride 106 100 - 111 mmol/L    CO2 29 21 - 32 mmol/L    Anion gap 5 3.0 - 18 mmol/L    Glucose 162 (H) 74 - 99 mg/dL    BUN 20 (H) 7.0 - 18 MG/DL    Creatinine 1.45 (H) 0.6 - 1.3 MG/DL    BUN/Creatinine ratio 14 12 - 20      GFR est AA 42 (L) >60 ml/min/1.73m2    GFR est non-AA 35 (L) >60 ml/min/1.73m2    Calcium 9.7 8.5 - 10.1 MG/DL    Bilirubin, total 0.6 0.2 - 1.0 MG/DL    ALT (SGPT) 23 13 - 56 U/L    AST (SGOT) 13 10 - 38 U/L    Alk.  phosphatase 116 45 - 117 U/L    Protein, total 6.8 6.4 - 8.2 g/dL    Albumin 3.2 (L) 3.4 - 5.0 g/dL    Globulin 3.6 2.0 - 4.0 g/dL    A-G Ratio 0.9 0.8 - 1.7     URINALYSIS W/ RFLX MICROSCOPIC    Collection Time: 12/15/20  4:16 AM   Result Value Ref Range    Color YELLOW Appearance CLEAR      Specific gravity 1.010 1.005 - 1.030      pH (UA) 7.5 5.0 - 8.0      Protein Negative NEG mg/dL    Glucose Negative NEG mg/dL    Ketone Negative NEG mg/dL    Bilirubin Negative NEG      Blood SMALL (A) NEG      Urobilinogen 1.0 0.2 - 1.0 EU/dL    Nitrites Negative NEG      Leukocyte Esterase Negative NEG     URINE MICROSCOPIC ONLY    Collection Time: 12/15/20  4:16 AM   Result Value Ref Range    WBC NONE 0 - 4 /hpf    RBC 4 to 10 0 - 5 /hpf    Epithelial cells FEW 0 - 5 /lpf    Bacteria Negative NEG /hpf       Radiologic Studies -   CT ABD PELV WO CONT   Final Result   IMPRESSION:   1. Intermittent areas of wall thickening in the large and small bowel may be   due to underdistention versus enterocolitis which is most commonly infectious or   inflammatory in etiology. Evaluation is limited given lack of intravenous   contrast.   2.  Abnormal contour concerning for solid 1.6 cm mass in the left upper kidney. Recommend further evaluation with contrast-enhanced renal protocol CT or MRI for   further characterization when clinically appropriate. 3.  Osseous degenerative changes and additional findings, as detailed in the   body of the report. * **  *                    Ct Abd Pelv Wo Cont    Result Date: 12/15/2020  EXAM: CT ABD PELV WO CONT CLINICAL INDICATION/HISTORY: Lower abdominal pain diffuse, benign exam. One year of abdominal cramping. COMPARISON: CT: 05/02/2016 TECHNIQUE:   CT of the abdomen and pelvis without intravenous contrast administration. Coronal and sagittal reformats were generated and reviewed. One or more dose reduction techniques were used on this CT: automated exposure control, adjustment of the mAs and/or kVp according to patient size, and iterative reconstruction techniques. The specific techniques used on this CT exam have been documented in the patient's electronic medical record.   Digital Imaging and Communications in Medicine (DICOM) format image data are available to nonaffiliated external healthcare facilities or entities on a secure, media free, reciprocally searchable basis with patient authorization for at least a 12-month period after this study. _______________ FINDINGS: Suboptimal evaluation given lack of intravenous contrast. LOWER THORAX: Cardiac megaly with enlarged left atrium and left ventricle. Aortic and mitral annular calcifications present. Paranasal sinuses and mastoid air cells are otherwise clear. Postsurgical changes consistent with prior open heart surgery, cardiac pacing leads terminate in the right atrium and right ventricle. No large pericardial effusion. Linear opacities at the lung bases likely represent atelectasis/scarring. Mild bilateral bronchiectasis. No large consolidation. No pleural effusion. LIVER: Normal contours. No suspicious liver lesions on this unenhanced CT. GALLBLADDER AND BILIARY: Prior cholecystectomy. No biliary dilation. SPLEEN: Calcified nodules likely sequela of granulomatous disease. PANCREAS: Partially fatty replaced. No pancreatic ductal dilation. ADRENALS: Normal. KIDNEYS: Scarring is seen in the right kidney. Atrophic left kidney with solid 1.6 cm structure along the ventral superior left kidney (axial image 76, coronal image 47). No hydronephrosis. GI TRACT: Small amount of fluid in the esophagus, correlate for reflux. Normal caliber small and large bowel loops. No morphology of bowel obstruction. Scattered areas of mild small and large bowel wall thickening. Prior appendectomy. BLADDER: Normal. PELVIC ORGANS: Prior hysterectomy. VASCULATURE: Fusiform dilation of the infrarenal aorta measures 3 cm (axial image 81), similar to prior study. Moderate burden of calcified and noncalcified atherosclerotic plaque around the aorta and branch vessels notably at the main abdominal ostia. LYMPH NODES: No mesenteric or retroperitoneal lymphadenopathy. OTHER: No free intraperitoneal air. No ascites.  Diffuse muscular fatty infiltration consistent with deconditioning. OSSEOUS STRUCTURES:  No acute osseous abnormality. Multilevel degenerative changes most pronounced in the lumbar spine. _______________     IMPRESSION: 1. Intermittent areas of wall thickening in the large and small bowel may be due to underdistention versus enterocolitis which is most commonly infectious or inflammatory in etiology. Evaluation is limited given lack of intravenous contrast. 2.  Abnormal contour concerning for solid 1.6 cm mass in the left upper kidney. Recommend further evaluation with contrast-enhanced renal protocol CT or MRI for further characterization when clinically appropriate. 3.  Osseous degenerative changes and additional findings, as detailed in the body of the report. * **  *       Medications ordered:   Medications   acetaminophen (TYLENOL) tablet 1,000 mg (1,000 mg Oral Given 12/15/20 0422)         Medical Decision Making   Initial Medical Decision Making and DDx:  Her age puts her at very high risk. Consider diverticulitis bowel obstruction. Simple UTI would also fit her symptoms pretty well. ED Course: Progress Notes, Reevaluation, and Consults:     5:11 AM Pt reevaluated at this time. Discussed results and findings, as well as, diagnosis and plan for discharge. Follow up with doctors/services listed. Return to the emergency department for alarming symptoms. Pt verbalizes understanding and agreement with plan. All questions addressed. No alarming findings on CT scan. Discussed with the patient. Discussed the renal mass and routed on her discharge instructions. Benign labs no signs of UTI either. Abdominal exam is benign on repeat physical exam.  Will discharge. I am the first provider for this patient. I reviewed the vital signs, available nursing notes, past medical history, past surgical history, family history and social history.     Patient Vitals for the past 12 hrs:   Temp Pulse Resp BP SpO2   12/15/20 0324 98.5 °F (36.9 °C) 80 18 138/70 100 %       Vital Signs-Reviewed the patient's vital signs. Pulse Oximetry Analysis, Cardiac Monitor, 12 lead ekg: No hypoxia on room air  Interpreted by the EP. Records Reviewed: Nursing notes reviewed (Time of Review: 3:33 AM)    Procedures:   Critical Care Time:   Aspirin: (was aspirin given for stroke?)    Diagnosis     Clinical Impression:   1. Abdominal pain, generalized        Disposition: Discharged      Follow-up Information     Follow up With Specialties Details Why Contact Info    [address] [place] [address]  In 2 days  [address] [place]   [phone]           Patient's Medications   Start Taking    No medications on file   Continue Taking    AMIODARONE (CORDARONE) 200 MG TABLET    Take 1 tablet by mouth daily. AMLODIPINE (NORVASC) 5 MG TABLET    Take 5 mg by mouth daily. APIXABAN (ELIQUIS) 5 MG TABLET    Take 1 tablet by mouth two (2) times a day. Indications: AF    ATORVASTATIN (LIPITOR) 20 MG TABLET    Take 20 mg by mouth daily. CARVEDILOL (COREG) 25 MG TABLET    Take 25 mg by mouth two (2) times daily (with meals). ESCITALOPRAM OXALATE (LEXAPRO) 10 MG TABLET    Take 1 Tab by mouth daily. FUROSEMIDE (LASIX) 20 MG TABLET    Take 3 tablet daily with breakfast    GLIMEPIRIDE (AMARYL) 2 MG TABLET    Take 2 mg by mouth every morning. LEVOTHYROXINE (SYNTHROID) 25 MCG TABLET    Take  by mouth Daily (before breakfast). LISINOPRIL (PRINIVIL, ZESTRIL) 20 MG TABLET    Take 20 mg by mouth daily. MORPHINE IR (MS IR) 30 MG TABLET    Take 1 Tab by mouth four (4) times daily as needed for Pain. Max Daily Amount: 120 mg. MORPHINE IR (MS IR) 30 MG TABLET    Take 1 Tab by mouth three (3) times daily as needed for Pain. Max Daily Amount: 90 mg. NALOXONE 4 MG/ACTUATION SPRY    4 mg by Nasal route as needed.     OXYCODONE-ACETAMINOPHEN (PERCOCET) 5-325 MG PER TABLET    Take 1 Tab by mouth every six (6) hours as needed for Pain. Max Daily Amount: 4 Tabs. POTASSIUM CHLORIDE (K-DUR, KLOR-CON) 20 MEQ TABLET    Take 0.5 Tabs by mouth daily. PROMETHAZINE (PHENERGAN) 12.5 MG TABLET    Take 12.5 mg by mouth every four (4) hours as needed. SPIRONOLACTONE (ALDACTONE) 25 MG TABLET    Take 1 Tab by mouth daily.    These Medications have changed    No medications on file   Stop Taking    No medications on file     _______________________________    Notes:    Kaycee Stevenson MD using Dragon dictation      _______________________________

## 2020-12-15 NOTE — DISCHARGE INSTRUCTIONS
There is a small mass seen incidentally in your kidney. I do not think this is related to the pain today but she should talk to your primary care doctor and have this imaged. Patient Education        Abdominal Pain: Care Instructions  Your Care Instructions     Abdominal pain has many possible causes. Some aren't serious and get better on their own in a few days. Others need more testing and treatment. If your pain continues or gets worse, you need to be rechecked and may need more tests to find out what is wrong. You may need surgery to correct the problem. Don't ignore new symptoms, such as fever, nausea and vomiting, urination problems, pain that gets worse, and dizziness. These may be signs of a more serious problem. Your doctor may have recommended a follow-up visit in the next 8 to 12 hours. If you are not getting better, you may need more tests or treatment. The doctor has checked you carefully, but problems can develop later. If you notice any problems or new symptoms, get medical treatment right away. Follow-up care is a key part of your treatment and safety. Be sure to make and go to all appointments, and call your doctor if you are having problems. It's also a good idea to know your test results and keep a list of the medicines you take. How can you care for yourself at home? · Rest until you feel better. · To prevent dehydration, drink plenty of fluids, enough so that your urine is light yellow or clear like water. Choose water and other caffeine-free clear liquids until you feel better. If you have kidney, heart, or liver disease and have to limit fluids, talk with your doctor before you increase the amount of fluids you drink. · If your stomach is upset, eat mild foods, such as rice, dry toast or crackers, bananas, and applesauce. Try eating several small meals instead of two or three large ones.   · Wait until 48 hours after all symptoms have gone away before you have spicy foods, alcohol, and drinks that contain caffeine. · Do not eat foods that are high in fat. · Avoid anti-inflammatory medicines such as aspirin, ibuprofen (Advil, Motrin), and naproxen (Aleve). These can cause stomach upset. Talk to your doctor if you take daily aspirin for another health problem. When should you call for help? Call 911 anytime you think you may need emergency care. For example, call if:    · You passed out (lost consciousness).     · You pass maroon or very bloody stools.     · You vomit blood or what looks like coffee grounds.     · You have new, severe belly pain. Call your doctor now or seek immediate medical care if:    · Your pain gets worse, especially if it becomes focused in one area of your belly.     · You have a new or higher fever.     · Your stools are black and look like tar, or they have streaks of blood.     · You have unexpected vaginal bleeding.     · You have symptoms of a urinary tract infection. These may include:  ? Pain when you urinate. ? Urinating more often than usual.  ? Blood in your urine.     · You are dizzy or lightheaded, or you feel like you may faint. Watch closely for changes in your health, and be sure to contact your doctor if:    · You are not getting better after 1 day (24 hours). Where can you learn more? Go to http://yousuf-lamine.info/  Enter F539 in the search box to learn more about \"Abdominal Pain: Care Instructions. \"  Current as of: June 26, 2019               Content Version: 12.6  © 0604-9628 [employer]. Care instructions adapted under license by [employer] (which disclaims liability or warranty for this information). If you have questions about a medical condition or this instruction, always ask your healthcare professional. [address] any warranty or liability for your use of this information.

## 2020-12-15 NOTE — ED TRIAGE NOTES
Pt arrives a/o x 4 via ems from home with c/c of abdominal pain. Pt reports this cramping pain x 1 year. Pt reports seeing several doctors who have tried several things but nothing is helping.